# Patient Record
Sex: MALE | Race: WHITE | NOT HISPANIC OR LATINO | Employment: FULL TIME | URBAN - METROPOLITAN AREA
[De-identification: names, ages, dates, MRNs, and addresses within clinical notes are randomized per-mention and may not be internally consistent; named-entity substitution may affect disease eponyms.]

---

## 2018-02-14 ENCOUNTER — TELEPHONE (OUTPATIENT)
Dept: UROLOGY | Facility: MEDICAL CENTER | Age: 63
End: 2018-02-14

## 2018-02-27 ENCOUNTER — OFFICE VISIT (OUTPATIENT)
Dept: SLEEP CENTER | Facility: CLINIC | Age: 63
End: 2018-02-27
Payer: COMMERCIAL

## 2018-02-27 VITALS
HEIGHT: 78 IN | DIASTOLIC BLOOD PRESSURE: 90 MMHG | WEIGHT: 255 LBS | BODY MASS INDEX: 29.5 KG/M2 | SYSTOLIC BLOOD PRESSURE: 120 MMHG | HEART RATE: 80 BPM

## 2018-02-27 DIAGNOSIS — G47.33 OSA (OBSTRUCTIVE SLEEP APNEA): Primary | ICD-10-CM

## 2018-02-27 DIAGNOSIS — G25.81 RLS (RESTLESS LEGS SYNDROME): ICD-10-CM

## 2018-02-27 PROCEDURE — 99213 OFFICE O/P EST LOW 20 MIN: CPT | Performed by: PSYCHIATRY & NEUROLOGY

## 2018-02-27 RX ORDER — ROSUVASTATIN CALCIUM 5 MG/1
TABLET, COATED ORAL
COMMUNITY
Start: 2017-11-07

## 2018-02-27 RX ORDER — PANTOPRAZOLE SODIUM 40 MG/1
40 TABLET, DELAYED RELEASE ORAL DAILY
Refills: 3 | COMMUNITY
Start: 2017-12-26 | End: 2020-08-24

## 2018-02-27 RX ORDER — GABAPENTIN 300 MG/1
300 CAPSULE ORAL
Qty: 30 CAPSULE | Refills: 1 | Status: SHIPPED | OUTPATIENT
Start: 2018-02-27 | End: 2018-09-14 | Stop reason: ALTCHOICE

## 2018-02-27 NOTE — PROGRESS NOTES
Progress Note - Sleep Center   Camacho Moreira 59 y o  male MRN: [id number]        Follow Up Evaluation / Problem:     Obstructive Sleep Apnea  Restless Legs Syndrome    HPI: Camacho Moreira is a 59y o  year old male  Previously diagnosed with obstructive sleep apnea and restless legs syndrome  Snoring and abnormal breathing has been well controlled using nasal CPAP at 8 centimeters of water  Unfortunately, his uncontrolled leg movements continue to be a problem  Now reports that he is having some difficulty in the evening prior to getting into bed  While lying on a sofa watching television his legs suddenly become uncomfortable  This is associated with an irrepressible urge to move the extremities  Symptoms improved significantly with movement  He denies any symptoms earlier today  He is able to fall asleep at night but then tends to [facility] his legs all night long  This makes it difficult for his wife to stay in bed with him  He is reportedly unaware of this problem  Review of Systems        Genitourinary erectile dysfunction   Cardiology lightheadedness   Gastrointestinal heartburn/acid reflux   Neurology difficulity finding words   Constitutional none   Integumentary none   Psychiatry anxiety   Musculoskeletal none   Pulmonary shortness of breath   ENT nasal or sinus congestion   Endocrine none   Hematological none           Current Outpatient Prescriptions:     gabapentin (NEURONTIN) 300 mg capsule, Take 1 capsule (300 mg total) by mouth daily at bedtime, Disp: 30 capsule, Rfl: 1    pantoprazole (PROTONIX) 40 mg tablet, Take 40 mg by mouth daily, Disp: , Rfl: 3    rosuvastatin (CRESTOR) 5 mg tablet, 1/2 tab po daily, Disp: , Rfl:     [place] Sleepiness Scale  Sitting and reading: Slight chance of dozing  Watching TV: Slight chance of dozing  Sitting, inactive in a public place (e g  a theatre or a meeting):  Would never doze  As a passenger in a car for an hour without a break: Would never doze  Lying down to rest in the afternoon when circumstances permit: High chance of dozing  Sitting and talking to someone: Would never doze  Sitting quietly after a lunch without alcohol: Slight chance of dozing  In a car, while stopped for a few minutes in traffic: Would never doze  Total score: 6              Vitals:    02/27/18 0900   BP: 120/90   Pulse: 80   Weight: 116 kg (255 lb)   Height: 6' 7" (2 007 m)       Body mass index is 28 73 kg/m²  EPWORTH SLEEPINES SCORE  Total score: 6    Historical Information       Past History Since Last Sleep Center Visit:     There has been no significant change since his last visit with the exception of demonstrating more significant symptoms early in the evening  He was previously well controlled using small doses of pramipexole however reports episodes of daytime sleepiness and possible with orthostasis without syncope  He has been tolerating nasal CPAP without difficulty  OBJECTIVE    PAP Pressure: Nasal CPAP set to deliver 8 cm of water pressure  DME Provider: [facility]    Physical Exam:     General:   well-nourished, well developed, well groomed and in no distress      Craniofacial:   normal    Nasal Airway:   normal    Oral Airway:   Mallampati  class IV, tonsils not seen    Neck:   normal, thyroid normal, carotid are normal bilaterally    Lungs:  clear bilaterally    Cardiovascular:   normal    Extremities:   normal    Neurological:   no change      ASSESSMENT / PLAN    1  RLS (restless legs syndrome)  gabapentin (NEURONTIN) 300 mg capsule   2  MADELEINE (obstructive sleep apnea)           Counseling / Coordination of Care  Total clinic time spent today 15 minutes  Greater than 50% of total time was spent with The patient and / or family counseling and / or coordination of care  A description of the counseling and / or coordination of care: We talked about maintaining his current level of nasal CPAP    I will order new supplies so that he can continue using this treatment over the next 12 months  Due to his difficulty with pramipexole the medication was discontinued  I have decided he might try gabapentin  He will start using 300 milligrams at approximately 9 p m  each night  He will contact the Sleep [address] [place] [address] in 1-2 weeks with a progress report  Medication will be titrated as needed for control of symptoms  If he demonstrates significant drowsiness prior to bedtime I may moved gabapentin to a later time and using other medications such as tramadol to help suppress leg movements in the evening while awake  Jack Ramsey DO    Board Certified in [place] [employer]

## 2018-02-27 NOTE — PROGRESS NOTES
Review of Systems      Genitourinary erectile dysfunction   Cardiology lightheadedness   Gastrointestinal heartburn/acid reflux   Neurology difficulity finding words   Constitutional none   Integumentary none   Psychiatry anxiety   Musculoskeletal none   Pulmonary shortness of breath   ENT nasal or sinus congestion   Endocrine none   Hematological none

## 2018-03-26 ENCOUNTER — OFFICE VISIT (OUTPATIENT)
Dept: UROLOGY | Facility: MEDICAL CENTER | Age: 63
End: 2018-03-26
Payer: COMMERCIAL

## 2018-03-26 VITALS
DIASTOLIC BLOOD PRESSURE: 78 MMHG | HEIGHT: 78 IN | SYSTOLIC BLOOD PRESSURE: 142 MMHG | WEIGHT: 250 LBS | BODY MASS INDEX: 28.93 KG/M2

## 2018-03-26 DIAGNOSIS — N52.37 ERECTILE DYSFUNCTION FOLLOWING PROSTATE ABLATIVE THERAPY: ICD-10-CM

## 2018-03-26 DIAGNOSIS — Z85.46 HISTORY OF MALIGNANT NEOPLASM OF PROSTATE: Primary | ICD-10-CM

## 2018-03-26 DIAGNOSIS — Z87.438 HISTORY OF BPH: ICD-10-CM

## 2018-03-26 LAB
SL AMB  POCT GLUCOSE, UA: NEGATIVE
SL AMB LEUKOCYTE ESTERASE,UA: NEGATIVE
SL AMB POCT BILIRUBIN,UA: NEGATIVE
SL AMB POCT BLOOD,UA: NEGATIVE
SL AMB POCT CLARITY,UA: CLEAR
SL AMB POCT COLOR,UA: YELLOW
SL AMB POCT KETONES,UA: NEGATIVE
SL AMB POCT NITRITE,UA: NEGATIVE
SL AMB POCT PH,UA: 5
SL AMB POCT SPECIFIC GRAVITY,UA: >=1.03
SL AMB POCT URINE PROTEIN: NEGATIVE
SL AMB POCT UROBILINOGEN: 0.2

## 2018-03-26 PROCEDURE — 81003 URINALYSIS AUTO W/O SCOPE: CPT | Performed by: UROLOGY

## 2018-03-26 PROCEDURE — 99215 OFFICE O/P EST HI 40 MIN: CPT | Performed by: UROLOGY

## 2018-03-26 RX ORDER — SILDENAFIL 50 MG/1
100 TABLET, FILM COATED ORAL DAILY PRN
Qty: 2 TABLET | Refills: 0 | Status: SHIPPED | COMMUNITY
Start: 2018-03-26 | End: 2019-04-04

## 2018-03-26 NOTE — LETTER
March 26, 2018     Graciela Ya DO  [address]    Patient: Heather Figueroa   YOB: 1955   Date of Visit: 3/26/2018       Dear Dr Erik Ly:    Thank you for referring Idris Neumann to me for evaluation  Below are my notes for this consultation  If you have questions, please do not hesitate to call me  I look forward to following your patient along with you  Sincerely,        Shaji Elizondo MD        CC: No Recipients  Shaji Elizondo MD  3/26/2018  9:47 AM  Sign at close encounter  Assessment/Plan:    No problem-specific Assessment & Plan notes found for this encounter  Diagnoses and all orders for this visit:    History of bladder cancer  -     POCT urine dip auto non-scope          Subjective:      Patient ID: Heather Figueroa is a 59 y o  male  HPI     Prostate cancer: The patient had a robotic prostatectomy on May 4, 2015 for a [place] 4 + 3 carcinoma in 10% of tissue, negative margins  Since then, he has had no signs or symptoms of recurrent disease  PSA on March 23, 2018 remains undetectable  He still wears a thin pad on a daily basis just to be safe  Does not do Kegals  He notes urinary frequency  He denies other significant urinary symptoms  Denies  gross hematuria, urinary tract infections or incontinence  He is taking No meds for his symptoms  IPSS Questionnaire (AUA-7): Over the past month    1)  How often have you had a sensation of not emptying your bladder completely after you finish urinating? 1 - Less than 1 time in 5   2)  How often have you had to urinate again less than two hours after you finished urinating? 2 - Less than half the time   3)  How often have you found you stopped and started again several times when you urinated? 0 - Not at all   4) How difficult have you found it to postpone urination?   0 - Not at all   5) How often have you had a weak urinary stream?  1 - Less than 1 time in 5   6) How often have you had to push or strain to begin urination? 0 - Not at all   7) How many times did you most typically get up to urinate from the time you went to bed until the time you got up in the morning? 1 - 1 time   Total Score:  5     QOL:   Mostly Satisfied  Erectile dysfunction: This is result of his surgery  He has tried the oral medications, alprostadil and Tri Mix  The Tri Mix is not working very well  He reports penile engorgement however is penis is not stiff enough for intercourse  In the past we discussed a vacuum erection device and  an implant  At that time, he was not interested in pursuing either of these treatments  He wants to try Viagra again  The following portions of the patient's history were reviewed and updated as appropriate: allergies, current medications, past family history, past medical history, past social history, past surgical history and problem list     Review of Systems   Constitutional: Negative for activity change and fatigue  Respiratory: Negative for shortness of breath and wheezing  Cardiovascular: Negative for chest pain  Gastrointestinal: Negative for abdominal pain  Genitourinary: Negative for difficulty urinating, dysuria, frequency, hematuria and urgency  Musculoskeletal: Negative for back pain and gait problem  Skin: Negative  Allergic/Immunologic: Negative  Neurological: Negative  Gabapentin for restless legs  Psychiatric/Behavioral: Negative  Objective:      /78 (BP Location: Left arm, Patient Position: Sitting, Cuff Size: Standard)   Ht 6' 7" (2 007 m)   Wt 113 kg (250 lb)   BMI 28 16 kg/m²           Physical Exam   Constitutional: He is oriented to person, place, and time  He appears well-developed and well-nourished  HENT:   Head: Normocephalic and atraumatic  Neck: Normal range of motion  Neck supple  Pulmonary/Chest: Effort normal    Musculoskeletal: Normal range of motion  Neurological: He is alert and oriented to person, place, and time  He has normal reflexes  Skin: Skin is warm and dry  Psychiatric: He has a normal mood and affect   His behavior is normal  Judgment and thought content normal

## 2018-03-26 NOTE — PROGRESS NOTES
Assessment/Plan:    No problem-specific Assessment & Plan notes found for this encounter  Diagnoses and all orders for this visit:    History of bladder cancer  -     POCT urine dip auto non-scope          Subjective:      Patient ID: Brandon Green is a 59 y o  male  HPI     Prostate cancer: The patient had a robotic prostatectomy on May 4, 2015 for a [place] 4 + 3 carcinoma in 10% of tissue, negative margins  Since then, he has had no signs or symptoms of recurrent disease  PSA on March 23, 2018 remains undetectable  He still wears a thin pad on a daily basis just to be safe  Does not do Kegals  He notes urinary frequency  He denies other significant urinary symptoms  Denies  gross hematuria, urinary tract infections or incontinence  He is taking No meds for his symptoms  IPSS Questionnaire (AUA-7): Over the past month    1)  How often have you had a sensation of not emptying your bladder completely after you finish urinating? 1 - Less than 1 time in 5   2)  How often have you had to urinate again less than two hours after you finished urinating? 2 - Less than half the time   3)  How often have you found you stopped and started again several times when you urinated? 0 - Not at all   4) How difficult have you found it to postpone urination? 0 - Not at all   5) How often have you had a weak urinary stream?  1 - Less than 1 time in 5   6) How often have you had to push or strain to begin urination? 0 - Not at all   7) How many times did you most typically get up to urinate from the time you went to bed until the time you got up in the morning? 1 - 1 time   Total Score:  5     QOL:   Mostly Satisfied  Erectile dysfunction: This is result of his surgery  He has tried the oral medications, alprostadil and Tri Mix  The Tri Mix is not working very well  He reports penile engorgement however is penis is not stiff enough for intercourse    In the past we discussed a vacuum erection device and  an implant  At that time, he was not interested in pursuing either of these treatments  He wants to try Viagra again  The following portions of the patient's history were reviewed and updated as appropriate: allergies, current medications, past family history, past medical history, past social history, past surgical history and problem list     Review of Systems   Constitutional: Negative for activity change and fatigue  Respiratory: Negative for shortness of breath and wheezing  Cardiovascular: Negative for chest pain  Gastrointestinal: Negative for abdominal pain  Genitourinary: Negative for difficulty urinating, dysuria, frequency, hematuria and urgency  Musculoskeletal: Negative for back pain and gait problem  Skin: Negative  Allergic/Immunologic: Negative  Neurological: Negative  Gabapentin for restless legs  Psychiatric/Behavioral: Negative  Objective:      /78 (BP Location: Left arm, Patient Position: Sitting, Cuff Size: Standard)   Ht 6' 7" (2 007 m)   Wt 113 kg (250 lb)   BMI 28 16 kg/m²          Physical Exam   Constitutional: He is oriented to person, place, and time  He appears well-developed and well-nourished  HENT:   Head: Normocephalic and atraumatic  Neck: Normal range of motion  Neck supple  Pulmonary/Chest: Effort normal    Musculoskeletal: Normal range of motion  Neurological: He is alert and oriented to person, place, and time  He has normal reflexes  Skin: Skin is warm and dry  Psychiatric: He has a normal mood and affect   His behavior is normal  Judgment and thought content normal

## 2018-04-20 ENCOUNTER — TELEPHONE (OUTPATIENT)
Dept: SLEEP CENTER | Facility: CLINIC | Age: 63
End: 2018-04-20

## 2018-04-20 NOTE — TELEPHONE ENCOUNTER
Received a vm from patient he reports  he is snoring with his Cpap at [facility]  It started a month ago  Call back spoke to spouse  She states he is snoring a lot at [facility] with machine in use  Also he stopped his gabapentin for RLS due to his eyes are blurry and he thinks it makes him not have dreams  It also didn't stop his symptoms of leg movement at [facility]  His wife states he also has trifocals and this could be the problem  He hasn't followed up with eye Doctor  Looks like pressure set at 8 cm on cpap

## 2018-04-24 DIAGNOSIS — G47.33 OSA ON CPAP: Primary | ICD-10-CM

## 2018-04-24 DIAGNOSIS — Z99.89 OSA ON CPAP: Primary | ICD-10-CM

## 2018-05-02 ENCOUNTER — TRANSCRIBE ORDERS (OUTPATIENT)
Dept: SLEEP CENTER | Facility: CLINIC | Age: 63
End: 2018-05-02

## 2018-09-14 ENCOUNTER — OFFICE VISIT (OUTPATIENT)
Dept: SLEEP CENTER | Facility: CLINIC | Age: 63
End: 2018-09-14
Payer: COMMERCIAL

## 2018-09-14 VITALS
BODY MASS INDEX: 27.24 KG/M2 | WEIGHT: 235.4 LBS | DIASTOLIC BLOOD PRESSURE: 80 MMHG | HEIGHT: 78 IN | SYSTOLIC BLOOD PRESSURE: 122 MMHG | HEART RATE: 80 BPM

## 2018-09-14 DIAGNOSIS — G25.81 RLS (RESTLESS LEGS SYNDROME): ICD-10-CM

## 2018-09-14 DIAGNOSIS — G47.33 OSA (OBSTRUCTIVE SLEEP APNEA): Primary | ICD-10-CM

## 2018-09-14 PROCEDURE — 99214 OFFICE O/P EST MOD 30 MIN: CPT | Performed by: NURSE PRACTITIONER

## 2018-09-14 RX ORDER — PRAMIPEXOLE DIHYDROCHLORIDE 0.25 MG/1
TABLET ORAL
COMMUNITY
Start: 2018-07-23 | End: 2018-09-14 | Stop reason: ALTCHOICE

## 2018-09-14 RX ORDER — CLONAZEPAM 0.5 MG/1
0.5 TABLET ORAL
Qty: 30 TABLET | Refills: 3 | Status: SHIPPED | OUTPATIENT
Start: 2018-09-14 | End: 2018-12-18 | Stop reason: SDUPTHER

## 2018-09-14 RX ORDER — CYCLOBENZAPRINE HCL 5 MG
TABLET ORAL
COMMUNITY
Start: 2018-08-01 | End: 2019-06-03 | Stop reason: ALTCHOICE

## 2018-09-14 NOTE — PATIENT INSTRUCTIONS
1  Begin clonazepam 0 5 mg at bedtime  As we discussed you may take half a tablet to evaluate its effects and increase if needed  2   Continue use of CPAP equipment nightly and put it back on after you go back to bed from awakening  2  Continue to clean your equipment, as discussed  3  Contact the Sleep [address] [place] [address] with any questions or concerns prior to your next visit, as needed  4    Schedule visit for follow-up in 3 months

## 2018-09-14 NOTE — PROGRESS NOTES
Progress Note - [facility] [address] 59 y o  male   :1955, MRN: [id number]  2018          Follow Up Evaluation / Problem:     Obstructive Sleep Apnea  Restless legs syndrome    HPI: Caitlin Bernal is a 59y o  year old male  He presents for follow up of obstructive sleep apnea and restless legs syndrome  He uses CPAP at 9cm of water pressure and feels comfortable when using it  He monitors his usage, air leakage and AHI daily, using his phone elke, and records this data on a paper chart  He has problems with continuing symptoms of restless legs  He was using pramipexole, which seemed to help, but began to have some dizziness with exercise, which he felt was related to this medication  After his last visit, he began gabapentin 300mg at bedtime  He noticed that after taking it for a few days, he was waking up with a dull, aching pain in the base of his skull  He stopped gabapentin        Review of Systems      Genitourinary none   Cardiology none   Gastrointestinal frequent heartburn/acid reflux   Neurology need to move extremities   Constitutional none   Integumentary none   Psychiatry none   Musculoskeletal legs twitching/jerking   Pulmonary snoring   ENT none   Endocrine none   Hematological none       Current Outpatient Prescriptions:     pantoprazole (PROTONIX) 40 mg tablet, Take 40 mg by mouth daily, Disp: , Rfl: 3    rosuvastatin (CRESTOR) 5 mg tablet, 1/2 tab po daily, Disp: , Rfl:     clonazePAM (KlonoPIN) 0 5 mg tablet, Take 1 tablet (0 5 mg total) by mouth daily at bedtime, Disp: 30 tablet, Rfl: 3    cyclobenzaprine (FLEXERIL) 5 mg tablet, , Disp: , Rfl:     sildenafil (VIAGRA) 50 MG tablet, Take 2 tablets (100 mg total) by mouth daily as needed for erectile dysfunction (Patient not taking: Reported on 2018 ), Disp: 2 tablet, Rfl: 0    [place] Sleepiness Scale  Sitting and reading: Slight chance of dozing  Watching TV: Slight chance of dozing  Sitting, inactive in a public place (e g  a theatre or a meeting): Would never doze  As a passenger in a car for an hour without a break: Would never doze  Lying down to rest in the afternoon when circumstances permit: Moderate chance of dozing  Sitting and talking to someone: Would never doze  Sitting quietly after a lunch without alcohol: Would never doze  In a car, while stopped for a few minutes in traffic: Would never doze  Total score: 4              Vitals:    09/14/18 1100   BP: 122/80   Pulse: 80   Weight: 107 kg (235 lb 6 4 oz)   Height: 6' 7" (2 007 m)       Body mass index is 26 52 kg/m²  EPWORTH SLEEPINESS SCORE  Total score: 4      Past History Since Last Sleep Center Visit:     He denies any changes to his health since his last visit  He continue to use CPAP nightly  His wife tells him that he snores at times while using CPAP  He continues to have restless leg symptoms in the evening while watching TV and his wife tells him that he moves his legs in bed, all night long, causing her to have difficulty sleeping in bed with him  He states that he has been waking up at 4am every morning, going to the bathroom and then returning to bed for 3 more hours without replacing his CPAP  He then wakes up feeling unrefreshed        The review of systems and following portions of the patient's history were reviewed and updated as appropriate: allergies, current medications, past family history, past medical history, past social history, past surgical history, and problem list         OBJECTIVE    PAP Pressure: Nasal CPAP set to deliver 9 cm of water pressure  DME Provider: [facility]    Physical Exam:     General Appearance:   Alert, cooperative, no distress, appears stated age     Head:   Normocephalic, without obvious abnormality, atraumatic     Eyes:   PERRL, conjunctiva/corneas clear, EOM's intact          Nose:  Nares normal, septum midline, no drainage or sinus tenderness           Throat:  Lips, teeth and gums normal; tongue normal size and  shape and midline mucosa moist and redundant bilaterally, uvula long and dipping below the base of the tongue, tonsils not visualized, Mallampati class 4       Neck:  Supple, symmetrical, trachea midline, no adenopathy; Thyroid: No enlargement, tenderness or nodules; no carotid bruit or JVD     Lungs:      Clear to auscultation bilaterally, respirations unlabored     Heart:   Regular rate and rhythm, S1 and S2 normal, no murmur, rub or gallop       Extremities:  Extremities normal, atraumatic, no cyanosis or edema     Pulses:  2+ and symmetric all extremities     Skin:  Skin color, texture, turgor normal, no rashes or lesions       Neurologic:  No focal deficits noted  Normal strength, sensation throughout       ASSESSMENT / PLAN    1  MADELEINE (obstructive sleep apnea)  PAP DME Resupply/Reorder   2  RLS (restless legs syndrome)  clonazePAM (KlonoPIN) 0 5 mg tablet           Counseling / Coordination of Care  Total clinic time spent today 35 minutes  Greater than 50% of total time was spent with the patient and / or family counseling and / or coordination of care  A description of the counseling / coordination of care:     Impressions, Diagnostic results, Prognosis, Instructions for management, Risks and benefits of treatment, Patient and family education, Risk factor reductions and Importance of compliance with treatment    Today I reviewed the patient's compliance data  he has been able to use the equipment 100% of all days recorded  Average usage was 4 or more hours 83% of all days recorded  The estimated AHI is 2 9 abnormal breathing events per hour  Response to treatment has been good  We discussed replacing his CPAP after getting up to use the bathroom, which may allow him to wake up feeling refreshed  CPAP supplies have been ordered for the next 12 months  We discussed his symptoms of restless legs    He does not feel that his evening symptoms are really bothering him  He will begin clonazepam 0 5mg at bedtime, which will hopefully improve his leg movement during sleep and consolidate his sleep, allowing him to stay sleeping longer in the morning  If his restless legs become more bothersome in the evening, he may restart pramipexole 1/2 tablet in the evening and take the clonazepam at bedtime  He will continue using his CPAP equipment at the settings noted above for the next 3 months  At that timehe will then return for a routine follow-up evaluation regarding his restless legs  I have asked him to contact the [address] with an update in the next 2 weeks or if he encounters any difficulties prior to that time  The following instructions have been given to the patient today:    Patient Instructions   1  Begin clonazepam 0 5 mg at bedtime  As we discussed you may take half a tablet to evaluate its effects and increase if needed  2   Continue use of CPAP equipment nightly and put it back on after you go back to bed from awakening  2  Continue to clean your equipment, as discussed  3  Contact the [address] with any questions or concerns prior to your next visit, as needed  4    Schedule visit for follow-up in 3 months        Gail Fernandez, [address] [place] [address]

## 2018-10-16 NOTE — TELEPHONE ENCOUNTER
Pt left message asking if it is ok for him to take half of a clonazePAM 0 5mg tablet? Is it ok to cut them in half?

## 2018-12-18 ENCOUNTER — OFFICE VISIT (OUTPATIENT)
Dept: SLEEP CENTER | Facility: CLINIC | Age: 63
End: 2018-12-18
Payer: COMMERCIAL

## 2018-12-18 VITALS
SYSTOLIC BLOOD PRESSURE: 122 MMHG | HEIGHT: 78 IN | OXYGEN SATURATION: 97 % | BODY MASS INDEX: 27.74 KG/M2 | WEIGHT: 239.8 LBS | HEART RATE: 83 BPM | DIASTOLIC BLOOD PRESSURE: 78 MMHG

## 2018-12-18 DIAGNOSIS — G25.81 RLS (RESTLESS LEGS SYNDROME): ICD-10-CM

## 2018-12-18 DIAGNOSIS — G47.33 OSA (OBSTRUCTIVE SLEEP APNEA): Primary | ICD-10-CM

## 2018-12-18 PROCEDURE — 99214 OFFICE O/P EST MOD 30 MIN: CPT | Performed by: NURSE PRACTITIONER

## 2018-12-18 RX ORDER — CLONAZEPAM 0.5 MG/1
0.5 TABLET ORAL
Qty: 90 TABLET | Refills: 1 | Status: SHIPPED | OUTPATIENT
Start: 2018-12-18 | End: 2019-06-03 | Stop reason: ALTCHOICE

## 2018-12-18 RX ORDER — DOXYCYCLINE HYCLATE 100 MG/1
100 CAPSULE ORAL 2 TIMES DAILY WITH MEALS
Refills: 2 | COMMUNITY
Start: 2018-11-19 | End: 2019-04-04 | Stop reason: DRUGHIGH

## 2018-12-18 NOTE — PROGRESS NOTES
Progress Note - [facility] [address] 59 y o  male   :1955, MRN: [id number]  2018          Follow Up Evaluation / Problem:     Obstructive Sleep Apnea  Restless Legs Syndrome    HPI: Fermin Osborne is a 59y o  year old male  He presents for follow up of restless legs syndrome  He has some symptoms in the evening when he is relaxing on his sofa or using his computer  His wife notices symptoms while he is sleeping, which he is unaware of  He was using pramipexole, which seemed to help, but began to have some dizziness with exercise, which he felt was related to this medication  He then began gabapentin 300mg at bedtime  He noticed that after taking it for a few days, he was waking up with a dull, aching pain in the base of his skull  He stopped gabapentin  At his last visit, he was given a prescription for clonazepam 0 5mg at bedtime  He reports that his wife tells him that he has less night time leg movements when he takes it  He continues to use CPAP every night       Review of Systems      Genitourinary difficulty with erection   Cardiology none   Gastrointestinal frequent heartburn/acid reflux   Neurology awaken with headache   Constitutional none   Integumentary none   Psychiatry none   Musculoskeletal legs twitching/jerking   Pulmonary none   ENT none   Endocrine none   Hematological none       Current Outpatient Prescriptions:     clonazePAM (KlonoPIN) 0 5 mg tablet, Take 1 tablet (0 5 mg total) by mouth daily at bedtime, Disp: 90 tablet, Rfl: 1    doxycycline hyclate (VIBRAMYCIN) 100 mg capsule, Take 100 mg by mouth 2 (two) times a day with meals, Disp: , Rfl: 2    metroNIDAZOLE (NORITRATE) 1 % cream, Apply topically daily, Disp: , Rfl:     Psyllium (KONSYL DAILY FIBER PO), Take by mouth, Disp: , Rfl:     rosuvastatin (CRESTOR) 5 mg tablet, 1/2 tab po daily, Disp: , Rfl:     cyclobenzaprine (FLEXERIL) 5 mg tablet, , Disp: , Rfl:    pantoprazole (PROTONIX) 40 mg tablet, Take 40 mg by mouth daily, Disp: , Rfl: 3    sildenafil (VIAGRA) 50 MG tablet, Take 2 tablets (100 mg total) by mouth daily as needed for erectile dysfunction (Patient not taking: Reported on 9/14/2018 ), Disp: 2 tablet, Rfl: 0    [place] Sleepiness Scale  Sitting and reading: Slight chance of dozing  Watching TV: Moderate chance of dozing  Sitting, inactive in a public place (e g  a theatre or a meeting): Moderate chance of dozing  As a passenger in a car for an hour without a break: Would never doze  Lying down to rest in the afternoon when circumstances permit: Moderate chance of dozing  Sitting and talking to someone: Would never doze  Sitting quietly after a lunch without alcohol: Would never doze  In a car, while stopped for a few minutes in traffic: Would never doze  Total score: 7              Vitals:    12/18/18 1300   BP: 122/78   Pulse: 83   SpO2: 97%   Weight: 109 kg (239 lb 12 8 oz)   Height: 6' 7" (2 007 m)       Body mass index is 27 01 kg/m²  EPWORTH SLEEPINESS SCORE  Total score: 7      Past History Since Last Sleep Center Visit:   He denies any changes to his health since his last visit  He began taking 1/2 tablet of clonazepam at bedtime  His wife reported that he had some improvement in symptoms  He increased the dose to a whole tablet  Symptoms improved even more  He feels more relaxed and feels like he sleeps very well when taking the medication    He reports that he and his wife like to enjoy 2-3 glasses of wine in the evening several times per week and he questions whether this is OK with the use of clonazepam     The review of systems and following portions of the patient's history were reviewed and updated as appropriate: allergies, current medications, past family history, past medical history, past social history, past surgical history, and problem list         OBJECTIVE    PAP Pressure: Nasal CPAP set to deliver 9 cm of water pressure  DME Provider: [facility]    Physical Exam:     General Appearance:   Alert, cooperative, no distress, appears stated age     Head:   Normocephalic, without obvious abnormality, atraumatic     Eyes:   PERRL, conjunctiva/corneas clear, EOM's intact          Nose:  Nares normal, septum midline, no drainage or sinus tenderness           Throat:  Lips, teeth and gums normal; tongue normal size and  shape and midline mucosa moist and redundant bilaterally, uvula long and dipping below the base of the tongue, tonsils not visualized, Mallampati class 4       Neck:  Supple, symmetrical, trachea midline, no adenopathy; Thyroid: No enlargement, tenderness or nodules; no carotid bruit or JVD     Lungs:      Clear to auscultation bilaterally, respirations unlabored     Heart:   Regular rate and rhythm, S1 and S2 normal, no murmur, rub or gallop       Extremities:  Extremities normal, atraumatic, no cyanosis or edema     Pulses:  2+ and symmetric all extremities     Skin:  Skin color, texture, turgor normal, no rashes or lesions       Neurologic:  CNII-XII intact  Normal strength, sensation throughout       ASSESSMENT / PLAN    1  MADELEINE (obstructive sleep apnea)     2  RLS (restless legs syndrome)  clonazePAM (KlonoPIN) 0 5 mg tablet           Counseling / Coordination of Care  Total clinic time spent today 30 minutes  Greater than 50% of total time was spent with the patient and / or family counseling and / or coordination of care  A description of the counseling / coordination of care:     Impressions, Diagnostic results, Prognosis, Instructions for management, Risks and benefits of treatment, Patient and family education, Risk factor reductions and Importance of compliance with treatment    Today, we discussed his symptoms of restless legs that occur on some evenings while relaxing, and also present during the night as periodic limb movements of sleep    He has found that 0 5mg of clonazepam improve his symptoms during the night  He awakens feeling like "he took something", but this wears off within the first hour of being awake  He feels that since he has been taking it at bedtime, he doesn't seem to get as many symptoms earlier in the evening as well  We discussed that he should avoid taking the medication if he is going to drink 2-3 glasses of wine before bedtime  He feels this will not be a problem  He will continue to use his CPAP equipment every night  He has been able to use the equipment 100% of all days recorded  Average usage was 4 or more hours 97% of all days recorded  The estimated AHI is 4 4 abnormal breathing events per hour  Response to treatment has been good  He has been receiving supplies and changing them appropriately  He will continue using this equipment at the settings noted above for the next 6 months  At that timehe will then return for a routine follow-up evaluation  I have asked him to contact the Sleep [facility] if he encounters any difficulties prior to that time  The following instructions have been given to the patient today:    Patient Instructions   1  Continue clonazepam 0 5mg at bedtime to help with periodic limb movement and restless legs in the evening  2   Avoid use with alcohol or prescription pain medications  3   Continue use of CPAP  4  Schedule appointment for follow up in 6 months          Mason Rosales, [address]

## 2018-12-18 NOTE — PATIENT INSTRUCTIONS
1   Continue clonazepam 0 5mg at bedtime to help with periodic limb movement and restless legs in the evening  2   Avoid use with alcohol or prescription pain medications  3   Continue use of CPAP  4  Schedule appointment for follow up in 6 months

## 2019-04-04 ENCOUNTER — OFFICE VISIT (OUTPATIENT)
Dept: UROLOGY | Facility: MEDICAL CENTER | Age: 64
End: 2019-04-04
Payer: COMMERCIAL

## 2019-04-04 VITALS
HEART RATE: 87 BPM | HEIGHT: 78 IN | SYSTOLIC BLOOD PRESSURE: 126 MMHG | DIASTOLIC BLOOD PRESSURE: 84 MMHG | WEIGHT: 241 LBS | BODY MASS INDEX: 27.88 KG/M2

## 2019-04-04 DIAGNOSIS — Z85.46 HISTORY OF MALIGNANT NEOPLASM OF PROSTATE: Primary | ICD-10-CM

## 2019-04-04 LAB
SL AMB  POCT GLUCOSE, UA: NEGATIVE
SL AMB LEUKOCYTE ESTERASE,UA: NEGATIVE
SL AMB POCT BILIRUBIN,UA: NEGATIVE
SL AMB POCT BLOOD,UA: NEGATIVE
SL AMB POCT CLARITY,UA: CLEAR
SL AMB POCT COLOR,UA: YELLOW
SL AMB POCT KETONES,UA: NORMAL
SL AMB POCT NITRITE,UA: NEGATIVE
SL AMB POCT PH,UA: 6
SL AMB POCT SPECIFIC GRAVITY,UA: 1.02
SL AMB POCT URINE PROTEIN: NEGATIVE
SL AMB POCT UROBILINOGEN: 0.2

## 2019-04-04 PROCEDURE — 81003 URINALYSIS AUTO W/O SCOPE: CPT | Performed by: UROLOGY

## 2019-04-04 PROCEDURE — 99214 OFFICE O/P EST MOD 30 MIN: CPT | Performed by: UROLOGY

## 2019-04-04 RX ORDER — DOXYCYCLINE HYCLATE 50 MG/1
CAPSULE ORAL
COMMUNITY
Start: 2019-04-03 | End: 2019-06-03 | Stop reason: ALTCHOICE

## 2019-06-03 ENCOUNTER — OFFICE VISIT (OUTPATIENT)
Dept: SLEEP CENTER | Facility: CLINIC | Age: 64
End: 2019-06-03
Payer: COMMERCIAL

## 2019-06-03 VITALS
WEIGHT: 243 LBS | SYSTOLIC BLOOD PRESSURE: 132 MMHG | HEIGHT: 78 IN | DIASTOLIC BLOOD PRESSURE: 88 MMHG | HEART RATE: 82 BPM | BODY MASS INDEX: 28.11 KG/M2

## 2019-06-03 DIAGNOSIS — G47.8 NON-RESTORATIVE SLEEP: ICD-10-CM

## 2019-06-03 DIAGNOSIS — Z99.89 OBSTRUCTIVE SLEEP APNEA TREATED WITH CONTINUOUS POSITIVE AIRWAY PRESSURE (CPAP): Primary | ICD-10-CM

## 2019-06-03 DIAGNOSIS — G47.33 OBSTRUCTIVE SLEEP APNEA TREATED WITH CONTINUOUS POSITIVE AIRWAY PRESSURE (CPAP): Primary | ICD-10-CM

## 2019-06-03 DIAGNOSIS — G25.81 RLS (RESTLESS LEGS SYNDROME): ICD-10-CM

## 2019-06-03 PROCEDURE — 99214 OFFICE O/P EST MOD 30 MIN: CPT | Performed by: NURSE PRACTITIONER

## 2019-06-03 RX ORDER — GABAPENTIN 300 MG/1
300 CAPSULE ORAL
Qty: 30 CAPSULE | Refills: 5 | Status: SHIPPED | OUTPATIENT
Start: 2019-06-03 | End: 2020-02-10 | Stop reason: ALTCHOICE

## 2019-06-04 PROBLEM — G47.8 NON-RESTORATIVE SLEEP: Status: ACTIVE | Noted: 2019-06-04

## 2019-06-05 ENCOUNTER — APPOINTMENT (OUTPATIENT)
Dept: LAB | Facility: MEDICAL CENTER | Age: 64
End: 2019-06-05
Payer: COMMERCIAL

## 2019-06-05 DIAGNOSIS — Z85.46 HISTORY OF MALIGNANT NEOPLASM OF PROSTATE: ICD-10-CM

## 2019-06-05 DIAGNOSIS — G25.81 RLS (RESTLESS LEGS SYNDROME): ICD-10-CM

## 2019-06-05 LAB
FERRITIN SERPL-MCNC: 279 NG/ML (ref 8–388)
IRON SATN MFR SERPL: 42 %
IRON SERPL-MCNC: 139 UG/DL (ref 65–175)
MAGNESIUM SERPL-MCNC: 2.5 MG/DL (ref 1.6–2.6)
PSA SERPL-MCNC: <0.1 NG/ML (ref 0–4)
TIBC SERPL-MCNC: 330 UG/DL (ref 250–450)

## 2019-06-05 PROCEDURE — 36415 COLL VENOUS BLD VENIPUNCTURE: CPT

## 2019-06-05 PROCEDURE — 82728 ASSAY OF FERRITIN: CPT

## 2019-06-05 PROCEDURE — 83735 ASSAY OF MAGNESIUM: CPT

## 2019-06-05 PROCEDURE — 83540 ASSAY OF IRON: CPT

## 2019-06-05 PROCEDURE — G0103 PSA SCREENING: HCPCS

## 2019-06-05 PROCEDURE — 83550 IRON BINDING TEST: CPT

## 2019-06-06 ENCOUNTER — TELEPHONE (OUTPATIENT)
Dept: UROLOGY | Facility: MEDICAL CENTER | Age: 64
End: 2019-06-06

## 2019-06-12 ENCOUNTER — TRANSCRIBE ORDERS (OUTPATIENT)
Dept: PHYSICAL THERAPY | Facility: CLINIC | Age: 64
End: 2019-06-12

## 2019-06-12 ENCOUNTER — EVALUATION (OUTPATIENT)
Dept: PHYSICAL THERAPY | Facility: CLINIC | Age: 64
End: 2019-06-12
Payer: COMMERCIAL

## 2019-06-12 DIAGNOSIS — M25.512 LEFT SHOULDER PAIN, UNSPECIFIED CHRONICITY: Primary | ICD-10-CM

## 2019-06-12 DIAGNOSIS — M25.512 ACUTE PAIN OF LEFT SHOULDER: Primary | ICD-10-CM

## 2019-06-12 PROCEDURE — 97161 PT EVAL LOW COMPLEX 20 MIN: CPT

## 2019-06-19 ENCOUNTER — OFFICE VISIT (OUTPATIENT)
Dept: PHYSICAL THERAPY | Facility: CLINIC | Age: 64
End: 2019-06-19
Payer: COMMERCIAL

## 2019-06-19 DIAGNOSIS — M25.512 ACUTE PAIN OF LEFT SHOULDER: Primary | ICD-10-CM

## 2019-06-19 PROCEDURE — 97112 NEUROMUSCULAR REEDUCATION: CPT

## 2019-06-19 PROCEDURE — 97140 MANUAL THERAPY 1/> REGIONS: CPT

## 2019-06-19 PROCEDURE — 97110 THERAPEUTIC EXERCISES: CPT

## 2019-06-25 ENCOUNTER — APPOINTMENT (OUTPATIENT)
Dept: PHYSICAL THERAPY | Facility: CLINIC | Age: 64
End: 2019-06-25
Payer: COMMERCIAL

## 2019-06-26 ENCOUNTER — OFFICE VISIT (OUTPATIENT)
Dept: PHYSICAL THERAPY | Facility: CLINIC | Age: 64
End: 2019-06-26
Payer: COMMERCIAL

## 2019-06-26 DIAGNOSIS — M25.512 ACUTE PAIN OF LEFT SHOULDER: Primary | ICD-10-CM

## 2019-06-26 PROCEDURE — 97112 NEUROMUSCULAR REEDUCATION: CPT

## 2019-06-26 PROCEDURE — 97110 THERAPEUTIC EXERCISES: CPT

## 2019-06-26 PROCEDURE — 97140 MANUAL THERAPY 1/> REGIONS: CPT

## 2019-07-02 ENCOUNTER — OFFICE VISIT (OUTPATIENT)
Dept: PHYSICAL THERAPY | Facility: CLINIC | Age: 64
End: 2019-07-02
Payer: COMMERCIAL

## 2019-07-02 DIAGNOSIS — M25.512 ACUTE PAIN OF LEFT SHOULDER: Primary | ICD-10-CM

## 2019-07-02 PROCEDURE — 97112 NEUROMUSCULAR REEDUCATION: CPT | Performed by: PHYSICAL THERAPIST

## 2019-07-02 PROCEDURE — 97110 THERAPEUTIC EXERCISES: CPT | Performed by: PHYSICAL THERAPIST

## 2019-07-02 PROCEDURE — 97140 MANUAL THERAPY 1/> REGIONS: CPT | Performed by: PHYSICAL THERAPIST

## 2019-07-02 NOTE — PROGRESS NOTES
Daily Note     Today's date: 2019  Patient name: Caitlin Bernal  : 1955  MRN: [id number]  Referring provider: Boris Ramachandran MD  Dx:   Encounter Diagnosis     ICD-10-CM    1  Acute pain of left shoulder M25 512                   Subjective: Pt reported that his left shoulder has been feeling great  He was able to carry 30# bags of mulch with no issues yesterday  Objective: See treatment diary below    Precautions: history of prostate cancer    Daily Treatment Diary   Manuals           L shoulder PROM EH LF CB                                                 Exercise Diary              UBE (fwd/back) 3 min ea x3min ea x3min ea          Pulleys 10" hold, 6 min 10" hold, x6min 10" hold x6 min          Wall ball circles Cw/ccw  20x2 ea Cw, ccw 2x20 ea Cw, ccw 2x20 ea          Scapular retractions 3"x20 3"x20 3"x20                       TB rows Blue  3" hold, 2x10 Blue 3" hold, 2x10 Blue 3" hold 2x10          TB extensions Blue  2x10 Blue 2x10 Blue 2x10          TB lawn mowers Pink   2x10 Pink 2x10 Pink 2x10          Serratus punch 2x10 3# 2x10 3# 2x10           S/L capsular stretch (IR/ER) 30"x3  ea 30" x3 ea 30'x3 ea                                                                                                                                                         Modalities             CP post prn deferred defers                            Assessment: Tolerated treatment well  Patient would benefit from continued PT  Pt continues to present with L shoulder external rotation weakness and slight pain with isometric test against wall  No pain with exercises and was given seated external rotation stretching/strap internal rotation stretch secondary to confusion and difficulty with performing S/L stretch  Continue to strength periscapular as tolerated with slow progressions in rotation  External rotation most limited motion at today's session (approximately 75 degrees)         Plan: Continue per plan of care

## 2019-07-03 ENCOUNTER — APPOINTMENT (OUTPATIENT)
Dept: PHYSICAL THERAPY | Facility: CLINIC | Age: 64
End: 2019-07-03
Payer: COMMERCIAL

## 2019-07-10 ENCOUNTER — OFFICE VISIT (OUTPATIENT)
Dept: PHYSICAL THERAPY | Facility: CLINIC | Age: 64
End: 2019-07-10
Payer: COMMERCIAL

## 2019-07-10 DIAGNOSIS — M25.512 ACUTE PAIN OF LEFT SHOULDER: Primary | ICD-10-CM

## 2019-07-10 PROCEDURE — 97112 NEUROMUSCULAR REEDUCATION: CPT

## 2019-07-10 PROCEDURE — 97110 THERAPEUTIC EXERCISES: CPT

## 2019-07-10 PROCEDURE — 97140 MANUAL THERAPY 1/> REGIONS: CPT | Performed by: PHYSICAL THERAPIST

## 2019-07-10 NOTE — PROGRESS NOTES
Addendum per Eva Dubon PT, OCS  PT initiated on 6/12/19, patient has attended x 4 visits  Rx has consisted of ROM, stretching, strengthening program and home exercise program     S: Pt denies L shoulder pain  Received a steroidal injection for treatment of poison ivy on 6/26/19 and wonders of this has contributed to pain relief  No recent sleep interruption associated with shoulder discomfort  Able to lift bags of mulch and do yardwork  O: Patient has full L shoulder AROM/PROM    Strength is 5/5  Impingment signs are negative  A: L shoulder doing remarkably well considering he has rotator cuff tear confirmed by MRI  P: Encrouraged continued HEP which was reviewed with him today  Patient to follow up with Dr Chet Todd next week

## 2019-07-10 NOTE — LETTER
2019    MD Dinesh Tanner[address] [address]    Patient: Rod Hoff   YOB: 1955   Date of Visit: 7/10/2019     Encounter Diagnosis     ICD-10-CM    1  Acute pain of left shoulder M25 512        Dear Dr Swati Botello:    Please review the attached Plan of Care from [facility] recent visit  Please verify that you agree therapy should continue by signing the attached document and sending it back to our office  If you have any questions or concerns, please don't hesitate to call  Sincerely,    Sharee Aleman, PT      Referring Provider:      I certify that I have read the below Plan of Care and certify the need for these services furnished under this plan of treatment while under my care  Marco Parker MD  [facility] 31: [phone]          Daily Note     Today's date: 7/10/2019  Patient name: Rod Hoff  : 1955  MRN: [id number]  Referring provider: Gracie Moreira MD  Dx:   Encounter Diagnosis     ICD-10-CM    1  Acute pain of left shoulder M25 512          Addendum per Virginia Pablo PT, OCS  PT initiated on 19, patient has attended x 4 visits  Rx has consisted of ROM, stretching, strengthening program and home exercise program     S: Pt denies L shoulder pain  Received a steroidal injection for treatment of poison ivy on 19 and wonders of this has contributed to pain relief  No recent sleep interruption associated with shoulder discomfort  Able to lift bags of mulch and do yardwork  O: Patient has full L shoulder AROM/PROM    Strength is 5/5  Impingment signs are negative  A: L shoulder doing remarkably well considering he has rotator cuff tear confirmed by MRI  P: Encrouraged continued HEP which was reviewed with him today  Patient to follow up with Dr Swati Botello next week

## 2019-07-10 NOTE — LETTER
July 10, 2019    Tae Palma MD  [facility] [address]    Patient: Caitlin Bernal   YOB: 1955   Date of Visit: 7/10/2019     Encounter Diagnosis     ICD-10-CM    1  Acute pain of left shoulder M25 512        Dear Dr Neo Estrada:    Please review the attached Plan of Care from [facility] recent visit  Please verify that you agree therapy should continue by signing the attached document and sending it back to our office  If you have any questions or concerns, please don't hesitate to call  Sincerely,    Nayana Ventura PT      Referring Provider:      I certify that I have read the below Plan of Care and certify the need for these services furnished under this plan of treatment while under my care  Tae Palma MD  [facility] 31: [phone]          Daily Note     Today's date: 7/10/2019  Patient name: Caitlin Bernal  : 1955  MRN: [id number]  Referring provider: Boris Ramachandran MD  Dx:   Encounter Diagnosis     ICD-10-CM    1  Acute pain of left shoulder M25 512           Addendum per Kody Honeycutt PT, OCS  PT initiated on 19, patient has attended x 4 visits  Rx has consisted of ROM, stretching, strengthening program and home exercise program     S: Pt denies L shoulder pain  Received a steroidal injection for treatment of poison ivy on 19 and wonders of this has contributed to pain relief  No recent sleep interruption associated with shoulder discomfort  Able to lift bags of mulch and do yardwork  O: Patient has full L shoulder AROM/PROM    Strength is 5/5  Impingment signs are negative  A: L shoulder doing remarkably well considering he has rotator cuff tear confirmed by MRI  P: Encrouraged continued HEP which was reviewed with him today  Patient to follow up with Dr Neo Estrada next week  room air

## 2019-07-10 NOTE — PROGRESS NOTES
Daily Note     Today's date: 7/10/2019  Patient name: Rik Acosta  : 1955  MRN: [id number]  Referring provider: Jeanine Rodriguez MD  Dx:   Encounter Diagnosis     ICD-10-CM    1  Acute pain of left shoulder M25 512          Subjective: Patient noted no new complaints  Lifted bags of mulch again with no pain before coming to therapy  Objective: See treatment diary below      Assessment:  Patient noted minor soreness with ball on wall cw  Patient assessed by PT RG  Patient discussed with physical therapist RG dc/to hep today due to patient feeling much better and pain free for two weeks since steroid injection for poison ivy  Reviewed HEP with patient  Patient was able to perform exercise program with good technique  Plan: Continue per plan of care        Precautions: history of prostate cancer     Daily Treatment Diary   Manuals 6/19 6/26 7/2  7/10               L shoulder PROM EH LF CB  RG                                                                                       Exercise Diary                        UBE (fwd/back) 3 min ea x3min ea x3min ea  x3 min ea               Pulleys 10" hold, 6 min 10" hold, x6min 10" hold x6 min  10" hold x6 min               Wall ball circles Cw/ccw  20x2 ea Cw, ccw 2x20 ea Cw, ccw 2x20 ea  Cw, ccw 2x20 ea               Scapular retractions 3"x20 3"x20 3"x20                                         TB rows Blue  3" hold, 2x10 Blue 3" hold, 2x10 Blue 3" hold 2x10  Blue 3" hold 2x10               TB extensions Blue  2x10 Blue 2x10 Blue 2x10  Blue 2x10               TB lawn mowers Pink   2x10 Pink 2x10 Pink 2x10 KAMERON TB 2x10               Serratus punch 2x10 3# 2x10 3# 2x10   4# 2x10               S/L capsular stretch (IR/ER) 30"x3  ea 30" x3 ea 30'x3 ea  30''x3ea                                                                                                                                                                                                                                                                                       Modalities                       CP post prn deferred defers

## 2019-10-25 ENCOUNTER — TELEPHONE (OUTPATIENT)
Dept: SLEEP CENTER | Facility: CLINIC | Age: 64
End: 2019-10-25

## 2019-10-25 NOTE — TELEPHONE ENCOUNTER
Left message that labs are normal, no need for follow-up, & to call nurse line with any questions, # provided

## 2019-10-25 NOTE — TELEPHONE ENCOUNTER
----- Message from Maru Silverman sent at 10/25/2019  7:56 AM EDT -----  Lab results are normal   No follow up needed

## 2020-02-10 ENCOUNTER — OFFICE VISIT (OUTPATIENT)
Dept: SLEEP CENTER | Facility: CLINIC | Age: 65
End: 2020-02-10
Payer: COMMERCIAL

## 2020-02-10 VITALS — WEIGHT: 246 LBS | BODY MASS INDEX: 28.46 KG/M2 | HEIGHT: 78 IN

## 2020-02-10 DIAGNOSIS — G47.33 OBSTRUCTIVE SLEEP APNEA TREATED WITH CONTINUOUS POSITIVE AIRWAY PRESSURE (CPAP): Primary | ICD-10-CM

## 2020-02-10 DIAGNOSIS — G47.8 NON-RESTORATIVE SLEEP: ICD-10-CM

## 2020-02-10 DIAGNOSIS — Z99.89 OBSTRUCTIVE SLEEP APNEA TREATED WITH CONTINUOUS POSITIVE AIRWAY PRESSURE (CPAP): Primary | ICD-10-CM

## 2020-02-10 DIAGNOSIS — G25.81 RLS (RESTLESS LEGS SYNDROME): ICD-10-CM

## 2020-02-10 PROCEDURE — 99214 OFFICE O/P EST MOD 30 MIN: CPT | Performed by: NURSE PRACTITIONER

## 2020-02-10 NOTE — PATIENT INSTRUCTIONS
1   Continue use of CPAP equipment nightly  2  Continue to clean your equipment, as discussed  3  Contact the Sleep [address] [place] [address] with any questions or concerns prior to your next visit, as needed  4  Schedule visit for follow-up in 1 year  5  You may consider adding vitamin D3 2000 units daily and 4000 units from November thru April      Nursing Support:  When: Monday through Friday 7A-5PM except holidays  Where: Our direct line is [phone]  If you are having a true emergency please call 911  In the event that the line is busy or it is after hours please leave a voice message and we will return your call  Please speak clearly, leaving your full name, birth date, best number to reach you and the reason for your call  Medication refills: We will need the name of the medication, the dosage, the ordering provider, whether you get a 30 or 90 day refill, and the pharmacy name and address  Medications will be ordered by the provider only  Nurses cannot call in prescriptions  Please allow 7 days for medication refills  Physician requested updates: If your provider requested that you call with an update after starting medication, please be ready to provide us the medication and dosage, what time you take your medication, the time you attempt to fall asleep, time you fall asleep, when you wake up, and what time you get out of bed  Sleep Study Results: We will contact you with sleep study results and/or next steps after the physician has reviewed your testing

## 2020-02-11 ENCOUNTER — TELEPHONE (OUTPATIENT)
Dept: SLEEP CENTER | Facility: CLINIC | Age: 65
End: 2020-02-11

## 2020-04-08 ENCOUNTER — TELEPHONE (OUTPATIENT)
Dept: UROLOGY | Facility: MEDICAL CENTER | Age: 65
End: 2020-04-08

## 2020-04-13 ENCOUNTER — APPOINTMENT (OUTPATIENT)
Dept: LAB | Facility: MEDICAL CENTER | Age: 65
End: 2020-04-13
Attending: UROLOGY
Payer: COMMERCIAL

## 2020-04-13 ENCOUNTER — TRANSCRIBE ORDERS (OUTPATIENT)
Dept: ADMINISTRATIVE | Facility: HOSPITAL | Age: 65
End: 2020-04-13

## 2020-04-13 DIAGNOSIS — Z85.46 H/O MALIGNANT NEOPLASM OF PROSTATE: ICD-10-CM

## 2020-04-13 DIAGNOSIS — Z85.46 H/O MALIGNANT NEOPLASM OF PROSTATE: Primary | ICD-10-CM

## 2020-04-13 LAB — PSA SERPL-MCNC: <0.1 NG/ML (ref 0–4)

## 2020-04-13 PROCEDURE — 84153 ASSAY OF PSA TOTAL: CPT

## 2020-04-15 ENCOUNTER — TELEPHONE (OUTPATIENT)
Dept: UROLOGY | Facility: MEDICAL CENTER | Age: 65
End: 2020-04-15

## 2020-05-21 ENCOUNTER — TELEPHONE (OUTPATIENT)
Dept: SLEEP CENTER | Facility: CLINIC | Age: 65
End: 2020-05-21

## 2020-05-21 DIAGNOSIS — G25.81 RLS (RESTLESS LEGS SYNDROME): Primary | ICD-10-CM

## 2020-05-21 RX ORDER — PRAMIPEXOLE DIHYDROCHLORIDE 0.25 MG/1
TABLET ORAL
Qty: 60 TABLET | Refills: 3 | Status: SHIPPED | OUTPATIENT
Start: 2020-05-21

## 2021-03-11 ENCOUNTER — OFFICE VISIT (OUTPATIENT)
Dept: SLEEP CENTER | Facility: CLINIC | Age: 66
End: 2021-03-11
Payer: MEDICARE

## 2021-03-11 VITALS
SYSTOLIC BLOOD PRESSURE: 110 MMHG | DIASTOLIC BLOOD PRESSURE: 62 MMHG | HEIGHT: 78 IN | BODY MASS INDEX: 28.35 KG/M2 | WEIGHT: 245 LBS

## 2021-03-11 DIAGNOSIS — G47.33 OBSTRUCTIVE SLEEP APNEA TREATED WITH CONTINUOUS POSITIVE AIRWAY PRESSURE (CPAP): Primary | ICD-10-CM

## 2021-03-11 DIAGNOSIS — G25.81 RLS (RESTLESS LEGS SYNDROME): ICD-10-CM

## 2021-03-11 DIAGNOSIS — Z99.89 OBSTRUCTIVE SLEEP APNEA TREATED WITH CONTINUOUS POSITIVE AIRWAY PRESSURE (CPAP): Primary | ICD-10-CM

## 2021-03-11 PROCEDURE — 99214 OFFICE O/P EST MOD 30 MIN: CPT | Performed by: NURSE PRACTITIONER

## 2021-03-11 RX ORDER — ROPINIROLE 0.5 MG/1
0.5 TABLET, FILM COATED ORAL
Qty: 30 TABLET | Refills: 5 | Status: SHIPPED | OUTPATIENT
Start: 2021-03-11

## 2021-03-11 NOTE — PROGRESS NOTES
Progress Note - [facility] [address] 73 y o  male   :1955, MRN: [id number]  3/11/2021        Follow Up Evaluation / Problem:  Obstructive Sleep Apnea  Restless Legs Syndrome      Thank you for the opportunity of participating in the evaluation and care of this patient in the Sleep Clinic at [facility]  HPI: Alisha Carson is a 73y o  year old male  The patient presents for follow up of obstructive sleep apnea and restless legs syndrome  He has used CPAP for many years and has been very compliant with use, despite his dislike of wearing the mask  He has used gabapentin, pramipexole and clonazepam to treat restless legs  He had some minor side effects with pramipexole and was concerned regarding long-term side effects of gabapentin and clonazepam     Review of Systems      Genitourinary difficulty with erection   Cardiology none   Gastrointestinal frequent heartburn/acid reflux   Neurology frequent headaches   Constitutional none   Integumentary none   Psychiatry none   Musculoskeletal legs twitching/jerking   Pulmonary none   ENT none   Endocrine none   Hematological none         Current Outpatient Medications:     Na Sulfate-K Sulfate-Mg Sulf 17 5-3 13-1 6 GM/177ML SOLN, Take 1 Package by mouth 2 (two) times a day, Disp: 2 Bottle, Rfl: 0    Omega-3 Fatty Acids (fish oil) 1,000 mg, Take 1,000 mg by mouth daily, Disp: , Rfl:     pantoprazole (PROTONIX) 40 mg tablet, Take 1 tablet (40 mg total) by mouth 2 (two) times a day before meals, Disp: 180 tablet, Rfl: 3    pramipexole (MIRAPEX) 0 25 mg tablet, Take one tablet at onset of evening symptoms, may repeat at bedtime, if needed  , Disp: 60 tablet, Rfl: 3    Psyllium (KONSYL DAILY FIBER PO), Take by mouth, Disp: , Rfl:     rosuvastatin (CRESTOR) 5 mg tablet, 1/2 tab po daily, Disp: , Rfl:     rOPINIRole (REQUIP) 0 5 mg tablet, Take 1 tablet (0 5 mg total) by mouth daily at bedtime, Disp: 30 tablet, Rfl: 5    [place] Sleepiness Scale  Sitting and reading: Would never doze  Watching TV: Slight chance of dozing  Sitting, inactive in a public place (e g  a theatre or a meeting): Would never doze  As a passenger in a car for an hour without a break: Would never doze  Lying down to rest in the afternoon when circumstances permit: Moderate chance of dozing  Sitting and talking to someone: Would never doze  Sitting quietly after a lunch without alcohol: Would never doze  In a car, while stopped for a few minutes in traffic: Would never doze  Total score: 3              Vitals:    03/11/21 1300   BP: 110/62   Weight: 111 kg (245 lb)   Height: 6' 7" (2 007 m)       Body mass index is 27 6 kg/m²  Neck Circumference: 17       [place] SLEEPINESS SCORE  Total score: 3      Past History Since Last Sleep Center Visit:   He denies any changes to his health since his last visit  He continues to use the CPAP equipment every night, due to snoring bothering his wife  Mask and pressure are comfortable  His wife notices some intermittent snoring with use  He reports that he does not change the mask cushions regularly  He had improvement in limb movements with use of gabapentin, but stopped due to concern of long-term side effects  He was using pramipexole 1/2 tablet in the evening and 1/2 tablet at bedtime for restless leg symptoms  His wife reported improvement in symptoms when taking this medication  He noticed a dull headache at the "base of the brain" in the morning after taking pramipexole  Symptoms occurred when he first started the medication, but seemed to go away the longer he was taking it       The review of systems and following portions of the patient's history were reviewed and updated as appropriate: allergies, current medications, past family history, past medical history, past social history, past surgical history, and problem list         OBJECTIVE    PAP Pressure: Nasal CPAP set to deliver 9 cm of water pressure  Type of mask used: full face  DME Provider: [facility]    Physical Exam:     General Appearance:   Alert, cooperative, no distress, appears stated age     Head:   Normocephalic, without obvious abnormality, atraumatic     Eyes:   PERRL, conjunctiva/corneas clear, EOM's intact          Nose:  Nares normal, septum midline, no drainage or sinus tenderness           Throat:  Lips, teeth and gums normal; tongue normal size and  shape and midline mucosa moist with excessive soft tissue at the oropharynx, uvula normal, tonsils not visualized, Mallampati class 3-4       Neck:  Supple, symmetrical, trachea midline, no adenopathy; Thyroid: No enlargement, tenderness or nodules; no carotid bruit or JVD     Lungs:      Clear to auscultation bilaterally, respirations unlabored     Heart:   Regular rate and rhythm, S1 and S2 normal, no murmur, rub or gallop       Extremities:  Extremities normal, atraumatic, no cyanosis or edema       Skin:  Skin color, texture, turgor normal, no rashes or lesions       Neurologic:  No focal deficits noted       ASSESSMENT / PLAN    1  Obstructive sleep apnea treated with continuous positive airway pressure (CPAP)     2  RLS (restless legs syndrome)  rOPINIRole (REQUIP) 0 5 mg tablet           Counseling / Coordination of Care  Total clinic time spent today 30 minutes  Greater than 50% of total time was spent with the patient and / or family counseling and / or coordination of care  A description of the counseling / coordination of care:     Impressions, Diagnostic results, Prognosis, Instructions for management, Risks and benefits of treatment, Patient and family education, Risk factor reductions and Importance of compliance with treatment    Today I reviewed the patient's compliance data  he has been able to use the equipment 100% of all days recorded    Average usage was 4 or more hours 93% of all days recorded  The estimated AHI is 1 8 abnormal breathing events per hour  The patient feels they benefit from the use of PAP equipment and would like to continue PAP therapy  Response to treatment has been good  Supplies have been ordered for the next year  He was encouraged to change his cushions more often, which may make use more comfortable and improve noise with use  He reports that he took vitamin D and fish oil for awhile, but did not notice any improvement in RLS symptoms  He restarted pramipexole  We discussed trying ropinirole, which may also work well for him, but may not cause the headache  He will hold pramipexole while using ropinirole  He will start with 1/2 tablet in the evening at onset of symptoms and repeat at bedtime  We discussed also splitting dose using pramipexole and ropinirole to see if the combination may further improve  He will call with an update when refills are needed  He will continue using this equipment at the settings noted above for the next 12 months  At that timehe will then return for a routine follow-up evaluation  I have asked the patient to contact the [employer] [address] [place] [address] if he encounters any difficulties prior to that time  The following instructions have been given to the patient today:    Patient Instructions   1  Continue use of CPAP equipment nightly  2  Continue to clean your equipment, as discussed  3  Contact the Sleep [address] [place], [place] [address]do [facility] with any questions or concerns prior to your next visit, as needed  4  Schedule visit for follow-up in 1 year  5  Try ropinirole 0 5mg - 1/2 tablet to start, you may increase to a full tablet, which you may want to split dose between early evening and bedtime  6   Call when you need refills  Nursing Support:  When: Monday through Friday 7A-5PM except holidays  Where: Our direct line is [phone]  If you are having a true emergency please call 911    In the event that the line is busy or it is after hours please leave a voice message and we will return your call  Please speak clearly, leaving your full name, birth date, best number to reach you and the reason for your call  Medication refills: We will need the name of the medication, the dosage, the ordering provider, whether you get a 30 or 90 day refill, and the pharmacy name and address  Medications will be ordered by the provider only  Nurses cannot call in prescriptions  Please allow 7 days for medication refills  Physician requested updates: If your provider requested that you call with an update after starting medication, please be ready to provide us the medication and dosage, what time you take your medication, the time you attempt to fall asleep, time you fall asleep, when you wake up, and what time you get out of bed  Sleep Study Results: We will contact you with sleep study results and/or next steps after the physician has reviewed your testing        Abdiaziz Singh, [address] [facility]

## 2021-03-11 NOTE — PATIENT INSTRUCTIONS
1   Continue use of CPAP equipment nightly  2  Continue to clean your equipment, as discussed  3  Contact the Sleep [address] [place] [address] with any questions or concerns prior to your next visit, as needed  4  Schedule visit for follow-up in 1 year  5  Try ropinirole 0 5mg - 1/2 tablet to start, you may increase to a full tablet, which you may want to split dose between early evening and bedtime  6   Call when you need refills  Nursing Support:  When: Monday through Friday 7A-5PM except holidays  Where: Our direct line is [phone]  If you are having a true emergency please call 911  In the event that the line is busy or it is after hours please leave a voice message and we will return your call  Please speak clearly, leaving your full name, birth date, best number to reach you and the reason for your call  Medication refills: We will need the name of the medication, the dosage, the ordering provider, whether you get a 30 or 90 day refill, and the pharmacy name and address  Medications will be ordered by the provider only  Nurses cannot call in prescriptions  Please allow 7 days for medication refills  Physician requested updates: If your provider requested that you call with an update after starting medication, please be ready to provide us the medication and dosage, what time you take your medication, the time you attempt to fall asleep, time you fall asleep, when you wake up, and what time you get out of bed  Sleep Study Results: We will contact you with sleep study results and/or next steps after the physician has reviewed your testing

## 2021-03-12 ENCOUNTER — TELEPHONE (OUTPATIENT)
Dept: SLEEP CENTER | Facility: CLINIC | Age: 66
End: 2021-03-12

## 2021-05-10 DIAGNOSIS — R97.20 ELEVATED PSA: Primary | ICD-10-CM

## 2021-06-18 ENCOUNTER — OFFICE VISIT (OUTPATIENT)
Dept: UROLOGY | Facility: MEDICAL CENTER | Age: 66
End: 2021-06-18
Payer: MEDICARE

## 2021-06-18 VITALS
WEIGHT: 250 LBS | BODY MASS INDEX: 28.93 KG/M2 | SYSTOLIC BLOOD PRESSURE: 120 MMHG | HEART RATE: 63 BPM | HEIGHT: 78 IN | DIASTOLIC BLOOD PRESSURE: 78 MMHG

## 2021-06-18 DIAGNOSIS — Z87.898 HISTORY OF ELEVATED PSA: ICD-10-CM

## 2021-06-18 DIAGNOSIS — Z12.5 SPECIAL SCREENING FOR MALIGNANT NEOPLASM OF PROSTATE: ICD-10-CM

## 2021-06-18 DIAGNOSIS — Z85.46 HISTORY OF PROSTATE CANCER: Primary | ICD-10-CM

## 2021-06-18 DIAGNOSIS — N52.31 ERECTILE DYSFUNCTION AFTER RADICAL PROSTATECTOMY: ICD-10-CM

## 2021-06-18 LAB
SL AMB  POCT GLUCOSE, UA: ABNORMAL
SL AMB LEUKOCYTE ESTERASE,UA: ABNORMAL
SL AMB POCT BILIRUBIN,UA: ABNORMAL
SL AMB POCT BLOOD,UA: ABNORMAL
SL AMB POCT CLARITY,UA: CLEAR
SL AMB POCT COLOR,UA: YELLOW
SL AMB POCT KETONES,UA: ABNORMAL
SL AMB POCT NITRITE,UA: ABNORMAL
SL AMB POCT PH,UA: 5
SL AMB POCT SPECIFIC GRAVITY,UA: 1.02
SL AMB POCT URINE PROTEIN: ABNORMAL
SL AMB POCT UROBILINOGEN: 0.2

## 2021-06-18 PROCEDURE — 81003 URINALYSIS AUTO W/O SCOPE: CPT | Performed by: UROLOGY

## 2021-06-18 PROCEDURE — 99214 OFFICE O/P EST MOD 30 MIN: CPT | Performed by: UROLOGY

## 2021-06-18 NOTE — PROGRESS NOTES
Assessment/Plan:    History of prostate cancer  No evidence of recurrent disease approximately 6 years after radical prostatectomy  Reassess in 1 year  Erectile dysfunction after radical prostatectomy  Patient will consider trying injection therapy again  We can titrate the dose higher to try to give him an erection  He will discuss this with his wife before filling the prescription  Diagnoses and all orders for this visit:    History of prostate cancer  -     PSA, Total Screen; Future    History of elevated PSA  -     POCT urine dip auto non-scope    Erectile dysfunction after radical prostatectomy  -     Papav-Phentolamine-Alprostadil (PGE1 / PHENTOLAMINE / PAPAVERINE, TRIMIX, 40 MCG / 1 MG / 30 MG INJECTION); 1 mL by Intracavernosal route once for 1 dose    Special screening for malignant neoplasm of prostate  -     PSA, Total Screen; Future          Subjective:      Patient ID: Poonam Dixon is a 73 y o  male  HPI  Prostate cancer:    The patient had a robotic prostatectomy on May 4, 2015 for a Deanna 4 + 3 carcinoma in 10% of tissue, negative margins   Since then, he has had no signs or symptoms of recurrent disease    He still wears a thin pad on a daily basis just to be safe   Does not do Kegals         He notes urinary frequency  He denies other significant urinary symptoms  He denies gross hematuria, urinary tract infections or incontinence  He is taking neither medications nor supplements for his symptoms  PSA:  Less than 0 01 on May 13, 2021 at [facility] labs  The official report is in the media tab     0   Lab Value Date/Time    PSA <0 1 04/13/2020 1423    PSA <0 1 06/05/2019 1243   ]    AUA SYMPTOM SCORE      Most Recent Value   AUA SYMPTOM SCORE   How often have you had a sensation of not emptying your bladder completely after you finished urinating? 1   How often have you had to urinate again less than two hours after you finished urinating?   3   How often have you found you stopped and started again several times when you urinate? 1   How often have you found it difficult to postpone urination? 1   How often have you had a weak urinary stream?  1   How often have you had to push or strain to begin urination? 0   How many times did you most typically get up to urinate from the time you went to bed at night until the time you got up in the morning? 1   Quality of Life: If you were to spend the rest of your life with your urinary condition just the way it is now, how would you feel about that?  2   AUA SYMPTOM SCORE  8        Erectile dysfunction:   The patient developed erectile dysfunction as result of his prostatectomy  In the past he has tried both oral medications and injection therapy  The oral medication did not work at all in the injection therapy was mediocre at best   We discussed trying the injection therapy again if he wants to try to resume sexual activity  We also discussed an implant and I demonstrated it for him  The patient patient reports that he and his wife are pretty content with the status quo  He took a prescription for Tri Mix with him so he could discuss this with his wife once again and decide whether to proceed with injection therapy or not  The following portions of the patient's history were reviewed and updated as appropriate: allergies, current medications, past family history, past medical history, past social history, past surgical history and problem list     Review of Systems        Objective:      /78   Pulse 63   Ht 6' 7" (2 007 m)   Wt 113 kg (250 lb)   BMI 28 16 kg/m²          Physical Exam  Constitutional:       Appearance: He is well-developed  HENT:      Head: Normocephalic and atraumatic  Pulmonary:      Effort: Pulmonary effort is normal    Musculoskeletal:         General: Normal range of motion  Cervical back: Normal range of motion  Skin:     General: Skin is warm and dry     Neurological: Mental Status: He is alert and oriented to person, place, and time  Psychiatric:         Behavior: Behavior normal          Thought Content:  Thought content normal          Judgment: Judgment normal

## 2021-06-19 PROBLEM — R97.20 ELEVATED PSA: Status: ACTIVE | Noted: 2021-06-19

## 2021-06-19 PROBLEM — Z85.46 HISTORY OF PROSTATE CANCER: Status: ACTIVE | Noted: 2021-06-19

## 2021-06-19 PROBLEM — N52.31 ERECTILE DYSFUNCTION AFTER RADICAL PROSTATECTOMY: Status: ACTIVE | Noted: 2021-06-19

## 2021-06-19 PROBLEM — R97.20 ELEVATED PSA: Status: RESOLVED | Noted: 2021-06-19 | Resolved: 2021-06-19

## 2021-06-19 NOTE — ASSESSMENT & PLAN NOTE
No evidence of recurrent disease approximately 6 years after radical prostatectomy  Reassess in 1 year

## 2021-06-19 NOTE — ASSESSMENT & PLAN NOTE
Patient will consider trying injection therapy again  We can titrate the dose higher to try to give him an erection  He will discuss this with his wife before filling the prescription

## 2022-03-10 PROBLEM — R73.03 PRE-DIABETES: Status: ACTIVE | Noted: 2017-10-04

## 2022-03-10 PROBLEM — Z82.49 FAMILY HISTORY OF HEART DISEASE: Status: ACTIVE | Noted: 2020-12-01

## 2022-03-10 PROBLEM — L71.9 ROSACEA: Status: ACTIVE | Noted: 2019-04-23

## 2022-03-11 ENCOUNTER — OFFICE VISIT (OUTPATIENT)
Dept: SLEEP CENTER | Facility: CLINIC | Age: 67
End: 2022-03-11
Payer: MEDICARE

## 2022-03-11 VITALS
DIASTOLIC BLOOD PRESSURE: 79 MMHG | HEART RATE: 95 BPM | SYSTOLIC BLOOD PRESSURE: 122 MMHG | HEIGHT: 78 IN | WEIGHT: 243 LBS | BODY MASS INDEX: 28.11 KG/M2

## 2022-03-11 DIAGNOSIS — G47.33 OSA (OBSTRUCTIVE SLEEP APNEA): Primary | ICD-10-CM

## 2022-03-11 DIAGNOSIS — G25.81 RLS (RESTLESS LEGS SYNDROME): ICD-10-CM

## 2022-03-11 DIAGNOSIS — G47.61 PERIODIC LIMB MOVEMENT SLEEP DISORDER: ICD-10-CM

## 2022-03-11 PROCEDURE — 99214 OFFICE O/P EST MOD 30 MIN: CPT | Performed by: PSYCHIATRY & NEUROLOGY

## 2022-03-11 NOTE — ASSESSMENT & PLAN NOTE
Has not had benefit from several medications including clonazepam, dopamine agonists, and gabapentin  It is possible this is causing non-refreshing sleep but that said, he has not had symptomatic relief from any of these agents  At present, I would not resume a new medication  We discussed that he drinks alcohol in significant quantities, these medications would not be compatible with that, this is something that he was very cognizant of  He has never been on Horizant, that may be a consideration in the future if he cut back on alcohol consumption and wants to try a new medication  He notes he has pramipexole at home and wants to know if he could resume a medication if he does not drink alcohol  Will we reviewed that augmentation would be a potential side effect in the future, in general I would recommend he not start this without discussing 1st with me

## 2022-03-11 NOTE — ASSESSMENT & PLAN NOTE
Has occasional restless leg syndrome which does not merit treatment, does not cause him much disturbance    Most recent ferritin seemed very normal

## 2022-03-11 NOTE — PROGRESS NOTES
Review of Systems      Genitourinary none   Cardiology none   Gastrointestinal none   Neurology none   Constitutional none   Integumentary none   Psychiatry none   Musculoskeletal legs twitching/jerking   Pulmonary none   ENT none   Endocrine none   Hematological none

## 2022-03-11 NOTE — ASSESSMENT & PLAN NOTE
He is doing well with his current treatment, AHI is normal   I reordered supplies for him today  He notes that he uses CPAP but does not like CPAP  We reviewed treatment alternatives including an oral appliance, or repeat sleep study to see if you would be a candidate for Inspire  In thinking further about this, he wishes to stay on CPAP now but will contact me if he wishes to pursue an alternative treatment

## 2022-03-11 NOTE — PROGRESS NOTES
Assessment/Plan:      1  MADELEINE (obstructive sleep apnea)  Assessment & Plan:  He is doing well with his current treatment, AHI is normal   I reordered supplies for him today  He notes that he uses CPAP but does not like CPAP  We reviewed treatment alternatives including an oral appliance, or repeat sleep study to see if you would be a candidate for Inspire  In thinking further about this, he wishes to stay on CPAP now but will contact me if he wishes to pursue an alternative treatment  Orders:  -     PAP DME Resupply/Reorder    2  Periodic limb movement sleep disorder  Assessment & Plan:  Has not had benefit from several medications including clonazepam, dopamine agonists, and gabapentin  It is possible this is causing non-refreshing sleep but that said, he has not had symptomatic relief from any of these agents  At present, I would not resume a new medication  We discussed that he drinks alcohol in significant quantities, these medications would not be compatible with that, this is something that he was very cognizant of  He has never been on Horizant, that may be a consideration in the future if he cut back on alcohol consumption and wants to try a new medication  He notes he has pramipexole at home and wants to know if he could resume a medication if he does not drink alcohol  Will we reviewed that augmentation would be a potential side effect in the future, in general I would recommend he not start this without discussing 1st with me  3  RLS (restless legs syndrome)  Assessment & Plan:  Has occasional restless leg syndrome which does not merit treatment, does not cause him much disturbance  Most recent ferritin seemed very normal            Subjective:      Patient ID: Carito Toledo is a 78 y o  male  Mr Vik Mckinney returns in follow-up of obstructive sleep apnea and restless leg syndrome  He was last seen by Mason Rosales approximately 1 year ago      He was diagnosed with obstructive sleep apnea in approximately 2007  He recalls he was snoring loudly back then  A retest in 2014 showed a respiratory disturbance index of 14 7 on the baseline portion of the test, titrated to CPAP 8 cm H2O  In addition periodic limb movements of sleep were noted, 62 per hour  Notes in 2016 indicated that his pramipexole was started for periodic limb movements of sleep  This medication was then discontinued as he indicated dizziness with exercise and he was switched to gabapentin in 2018, notes also indicated restless legs in the evening  Gabapentin was noted to cause a dull pain in his scalp so it was discontinued  He was then switched to clonazepam for periodic limb movements of sleep, it seemed that restless leg syndrome was not active  Per notes, he discontinued clonazepam as notes indicate there was concern with use of alcohol and that medication, he was then switched to gabapentin which in 2019 was noted to be effective  In 2020, he resume pramipexole again and found to be effective  At his visit 1 year ago, he was switched to ropinirole  He stopped it shortly thereafter  In discussion, he notes that when he watches TV at night, he has a feeling of tightening in his leg, not nightly  This occurs a few times a week  His wife also tells him he moves his legs a lot in his sleep  PAP Data  AHI 0 9  Used 30/30 nights  Average session 6 hr 56 min   Airsense 10 auto set at 9 cm h20  95% leak- high  Uses a full face mask    He is happy with his energy level in the day  Notes that he is still not-refreshed when he wakes    No napping or dozing in the day  He generally goes to bed 12 am, finally wakes at 8-830 AM for good  Has 2 awakenings  Sleeps 5-7 hours a night  He perceives that his sleep quality is affected by PAP use             [place] Sleepiness Scale:     Sitting and reading: Would never doze  Watching TV: Would never doze  Sitting, inactive in a public place (e g  a theatre or a meeting): Would never doze  As a passenger in a car for an hour without a break: Would never doze  Lying down to rest in the afternoon when circumstances permit: Moderate chance of dozing  Sitting and talking to someone: Would never doze  Sitting quietly after a lunch without alcohol: Would never doze  In a car, while stopped for a few minutes in traffic: Would never doze  Total score: 2     The following portions of the patient's history were reviewed and updated as appropriate: allergies, current medications, past family history, past medical history, past social history, past surgical history, and problem list     Review of Systems    Genitourinary none   Cardiology none   Gastrointestinal none   Neurology none   Constitutional none   Integumentary none   Psychiatry none   Musculoskeletal legs twitching/jerking   Pulmonary none   ENT none   Endocrine none   Hematological none       Objective:      /79 (BP Location: Left arm, Patient Position: Sitting, Cuff Size: Large)   Pulse 95   Ht 6' 7" (2 007 m)   Wt 110 kg (243 lb)   BMI 27 38 kg/m²          Physical Exam  Constitutional:       Appearance: Normal appearance  HENT:      Head: Normocephalic and atraumatic  Mouth/Throat:      Comments: mallamoati 3-4 airway, elongated soft palate, crossbite   Cardiovascular:      Rate and Rhythm: Normal rate and regular rhythm  Pulses: Normal pulses  Heart sounds: Normal heart sounds  Pulmonary:      Effort: Pulmonary effort is normal       Breath sounds: Normal breath sounds  Neurological:      Mental Status: He is alert           Labs reviewed- serum ferritin 233 ng/ml last 10/2021

## 2022-03-14 ENCOUNTER — TELEPHONE (OUTPATIENT)
Dept: SLEEP CENTER | Facility: CLINIC | Age: 67
End: 2022-03-14

## 2022-04-29 ENCOUNTER — TELEPHONE (OUTPATIENT)
Dept: OTHER | Facility: OTHER | Age: 67
End: 2022-04-29

## 2022-05-03 ENCOUNTER — TELEPHONE (OUTPATIENT)
Dept: OTHER | Facility: OTHER | Age: 67
End: 2022-05-03

## 2022-05-03 NOTE — TELEPHONE ENCOUNTER
If patient has not had proper TriMix education performed by [facility] that appointment needs to be scheduled

## 2022-05-03 NOTE — TELEPHONE ENCOUNTER
Papav-Phentolamine-Alprostadil (PGE1 / PHENTOLAMINE / PAPAVERINE, TRIMIX, 40 MCG / 1 MG / 30 MG INJECTION) [[id number]]    Patient filled RX today at [facility]  RX was prescribed by uro on 06/18/2021 and patient did not fill until today  Please reach out to patient he is not sure what dosage to take  It does not state anywhere on the RX and please see if medication is still something he can be on  Prescriber doctor Renée Christy is retired

## 2022-05-03 NOTE — TELEPHONE ENCOUNTER
Call placed to patient  He did not answer  LMOM asking patient to contact the office to review recommendations of the CRNP and that appointment needs to be scheduled for injection teaching  Office number provided for the patient to call back and discuss

## 2022-05-03 NOTE — TELEPHONE ENCOUNTER
Call placed to patient and spoke with him  Pt informed me that when he last saw Dr Yumi Collins he asked for script for Trimix injection  Pt was taught how to inject this medication and has done this in the past    He is interested in restarting this again and wanted to know what the dosing percentage he should be starting at  Pt stated that according to his script it stated 1ml  Pt wanted to inform me that he will be starting with this amount and will contact the office if this does not work to discuss further treatment

## 2022-10-02 NOTE — PROGRESS NOTES
Progress Note - [facility] [address] [place] 60 y o  male   :1955, MRN: [id number]  2/10/2020          Follow Up Evaluation / Problem:  Obstructive Sleep Apnea  Restless Legs Syndrome      Thank you for the opportunity of participating in the evaluation and care of this patient [facility]  HPI: Danielle Israel is a 60y o  year old male  The patient presents for follow up of obstructive sleep apnea and restless legs syndrome  He has used CPAP for many years and has been very compliant with use, despite his dislike of wearing the mask  He has used gabapentin, pramipexole and clonazepam to treat restless legs, however, he has stopped the medications due to concern of side effects and harm to his body over time      Review of Systems      Genitourinary need to urinate more than twice a night   Cardiology none   Gastrointestinal frequent heartburn/acid reflux   Neurology frequent headaches   Constitutional none   Integumentary none   Psychiatry none   Musculoskeletal legs twitching/jerking   Pulmonary none   ENT none   Endocrine none   Hematological none       Current Outpatient Medications:     pantoprazole (PROTONIX) 40 mg tablet, Take 40 mg by mouth daily, Disp: , Rfl: 3    Psyllium (KONSYL DAILY FIBER PO), Take by mouth, Disp: , Rfl:     rosuvastatin (CRESTOR) 5 mg tablet, 1/2 tab po daily, Disp: , Rfl:     [place] Sleepiness Scale  Sitting and reading: Slight chance of dozing  Watching TV: Slight chance of dozing  Sitting, inactive in a public place (e g  a theatre or a meeting): Slight chance of dozing  As a passenger in a car for an hour without a break: Slight chance of dozing  Lying down to rest in the afternoon when circumstances permit: Slight chance of dozing  Sitting and talking to someone: Slight chance of dozing  Sitting quietly after a lunch without alcohol: Slight chance of dozing  In a car, while stopped for a few minutes in traffic: Slight chance of dozing  Total score: 8              Vitals:    02/10/20 1300   Weight: 112 kg (246 lb)   Height: 6' 7" (2 007 m)       Body mass index is 27 71 kg/m²  Neck Circumference: 17       [place] SLEEPINESS SCORE  Total score: 8      Past History Since Last Sleep Center Visit:   He reports recent difficulty using CPAP equipment, due to URI for over 2 weeks last month  He continues to use his CPAP every night  He does not like sleeping with it and feels that he sleeps better when he is not using it  He continues to use it, due to concerns of complications of untreated sleep apnea  He was using gabapentin 300mg at bedtime for restless legs  His wife noticed improvement with use of gabapentin  He has stopped using gabapentin due to concern of possible side effects over time  The patient reports that he cleans the equipment appropriately  He does not change supplies very regularly      The review of systems and following portions of the patient's history were reviewed and updated as appropriate: allergies, current medications, past family history, past medical history, past social history, past surgical history, and problem list         OBJECTIVE    PAP Pressure: Nasal CPAP set to deliver 9 cm of water pressure  Type of mask used: full face  DME Provider: [facility]    Physical Exam:     General Appearance:   Alert, cooperative, no distress, appears stated age     Head:   Normocephalic, without obvious abnormality, atraumatic     Eyes:   PERRL, conjunctiva/corneas clear, EOM's intact          Nose:  Nares normal, septum midline, no drainage or sinus tenderness           Throat:  Lips, teeth and gums normal; tongue normal size and  shape and midline mucosa moist with excessive soft tissue at the oropharynx, uvula normal, tonsils not visualized, Mallampati class 4       Neck:  Supple, symmetrical, trachea midline, no adenopathy; Thyroid: No enlargement, tenderness or nodules; no carotid bruit or JVD     Lungs:      Clear to auscultation bilaterally, respirations unlabored     Heart:   Regular rate and rhythm, S1 and S2 normal, no murmur, rub or gallop       Extremities:  Extremities normal, atraumatic, no cyanosis or edema       Skin:  Skin color, texture, turgor normal, no rashes or lesions       Neurologic:  No focal deficits noted       ASSESSMENT / PLAN    1  Obstructive sleep apnea treated with continuous positive airway pressure (CPAP)  PAP DME Resupply/Reorder   2  Non-restorative sleep     3  RLS (restless legs syndrome)             Counseling / Coordination of Care  Total clinic time spent today 30 minutes  Greater than 50% of total time was spent with the patient and / or family counseling and / or coordination of care  A description of the counseling / coordination of care:     Impressions, Diagnostic results, Prognosis, Instructions for management, Risks and benefits of treatment, Patient and family education, Risk factor reductions and Importance of compliance with treatment    Today I reviewed the patient's compliance data  he has been able to use the equipment 90% of all days recorded  Average usage was 4 or more hours 74% of all days recorded  The estimated AHI is 1 6 abnormal breathing events per hour  The patient feels they benefit from the use of PAP equipment and would like to continue PAP therapy  Response to treatment has been good  Supplies have been ordered for the next year  He was encouraged to change his cushions more often, which may make use more comfortable  Lab testing did not indicate low ferritin or magnesium levels  He does not wish to take any medication for restless legs at this time  We discussed that low vitamin D levels can worsen symptoms  He does not currently take any vitamin D supplement  He may consider using vitamin D3, 4000 units daily to see if it helps    He will continue using this equipment at the settings noted above for the next 12 months  At that timehe will then return for a routine follow-up evaluation  I have asked the patient to contact the [address] if he encounters any difficulties prior to that time  The following instructions have been given to the patient today:    Patient Instructions   1  Continue use of CPAP equipment nightly  2  Continue to clean your equipment, as discussed  3  Contact the [address] with any questions or concerns prior to your next visit, as needed  4  Schedule visit for follow-up in 1 year  5  You may consider adding vitamin D3 2000 units daily and 4000 units from November thru April      Nursing Support:  When: Monday through Friday 7A-5PM except holidays  Where: Our direct line is [phone]  If you are having a true emergency please call 911  In the event that the line is busy or it is after hours please leave a voice message and we will return your call  Please speak clearly, leaving your full name, birth date, best number to reach you and the reason for your call  Medication refills: We will need the name of the medication, the dosage, the ordering provider, whether you get a 30 or 90 day refill, and the pharmacy name and address  Medications will be ordered by the provider only  Nurses cannot call in prescriptions  Please allow 7 days for medication refills  Physician requested updates: If your provider requested that you call with an update after starting medication, please be ready to provide us the medication and dosage, what time you take your medication, the time you attempt to fall asleep, time you fall asleep, when you wake up, and what time you get out of bed  Sleep Study Results: We will contact you with sleep study results and/or next steps after the physician has reviewed your testing        Kayleen Felton, [address] [place] [address] 1

## 2022-10-11 ENCOUNTER — TELEPHONE (OUTPATIENT)
Dept: UROLOGY | Facility: AMBULATORY SURGERY CENTER | Age: 67
End: 2022-10-11

## 2022-10-11 DIAGNOSIS — Z12.5 ENCOUNTER FOR SCREENING FOR MALIGNANT NEOPLASM OF PROSTATE: ICD-10-CM

## 2022-10-11 DIAGNOSIS — Z87.898 HISTORY OF ELEVATED PSA: Primary | ICD-10-CM

## 2022-12-12 ENCOUNTER — APPOINTMENT (OUTPATIENT)
Dept: LAB | Facility: MEDICAL CENTER | Age: 67
End: 2022-12-12

## 2022-12-12 DIAGNOSIS — Z12.5 ENCOUNTER FOR SCREENING FOR MALIGNANT NEOPLASM OF PROSTATE: ICD-10-CM

## 2022-12-12 DIAGNOSIS — Z87.898 HISTORY OF ELEVATED PSA: ICD-10-CM

## 2022-12-12 LAB — PSA SERPL-MCNC: <0.1 NG/ML (ref 0–4)

## 2023-01-19 PROBLEM — Z98.890 HX OF COLONOSCOPY: Status: ACTIVE | Noted: 2021-02-11

## 2023-01-26 RX ORDER — IMIQUIMOD 12.5 MG/.25G
CREAM TOPICAL
COMMUNITY
Start: 2023-01-25

## 2023-02-02 ENCOUNTER — OFFICE VISIT (OUTPATIENT)
Dept: UROLOGY | Facility: MEDICAL CENTER | Age: 68
End: 2023-02-02

## 2023-02-02 VITALS
DIASTOLIC BLOOD PRESSURE: 80 MMHG | WEIGHT: 243 LBS | HEIGHT: 78 IN | SYSTOLIC BLOOD PRESSURE: 120 MMHG | HEART RATE: 54 BPM | BODY MASS INDEX: 28.11 KG/M2

## 2023-02-02 DIAGNOSIS — Z85.46 HISTORY OF PROSTATE CANCER: ICD-10-CM

## 2023-02-02 DIAGNOSIS — N52.31 ERECTILE DYSFUNCTION AFTER RADICAL PROSTATECTOMY: ICD-10-CM

## 2023-02-02 DIAGNOSIS — Z87.898 HISTORY OF ELEVATED PSA: Primary | ICD-10-CM

## 2023-02-02 LAB
SL AMB  POCT GLUCOSE, UA: NORMAL
SL AMB LEUKOCYTE ESTERASE,UA: NORMAL
SL AMB POCT BILIRUBIN,UA: NORMAL
SL AMB POCT BLOOD,UA: NORMAL
SL AMB POCT CLARITY,UA: CLEAR
SL AMB POCT COLOR,UA: YELLOW
SL AMB POCT KETONES,UA: NORMAL
SL AMB POCT NITRITE,UA: NORMAL
SL AMB POCT PH,UA: 5.5
SL AMB POCT SPECIFIC GRAVITY,UA: 1.01
SL AMB POCT URINE PROTEIN: NORMAL
SL AMB POCT UROBILINOGEN: 0.2

## 2023-02-02 NOTE — PROGRESS NOTES
2/2/2023      Chief Complaint   Patient presents with   • Prostate Cancer     [address]      Assessment and Plan    80 y o  male managed by our office; previously Dr Hadley Conception  1  Prostate Cancer   · Radical prostatectomy 2015  · PSA performed 12/12/2022 resulted less than 0 1  · Repeat PSA in 1 year    2  Erectile Dysfunction   · Increase Trimix to 100-3-30-prescription sent to pharmacy      History of Present Illness  Miquel Mahan is a 80 y o  male here for follow up evaluation of prostate cancer status post robotic prostatectomy 5/4/2015 for [place] 7 (4+3) and 10% of the tissue  Margins were negative  On evaluation today, patient with complaints of continued increased urinary frequency  He also reports erectile dysfunction for which she has been managed with Trimix  He reports no significant change to his general health  He denies dysuria and hematuria  Review of Systems   Constitutional: Negative for chills and fever  Respiratory: Negative for cough and shortness of breath  Cardiovascular: Negative for chest pain  Gastrointestinal: Negative for abdominal distention, abdominal pain, blood in stool, nausea and vomiting  Genitourinary: Positive for urgency  Negative for difficulty urinating, dysuria, enuresis, flank pain, frequency and hematuria  Skin: Negative for rash  AUA SYMPTOM SCORE    Flowsheet Row Most Recent Value   AUA SYMPTOM SCORE    How often have you had a sensation of not emptying your bladder completely after you finished urinating? 3   How often have you had to urinate again less than two hours after you finished urinating? 3   How often have you found you stopped and started again several times when you urinate? 1   How often have you found it difficult to postpone urination? 1   How often have you had a weak urinary stream? 1   How often have you had to push or strain to begin urination?  1   How many times did you most typically get up to urinate from the time you went to bed at night until the time you got up in the morning? 1   Quality of Life: If you were to spend the rest of your life with your urinary condition just the way it is now, how would you feel about that? 2   AUA SYMPTOM SCORE 11             Past Medical History  Past Medical History:   Diagnosis Date   • Balanitis    • BPH with urinary obstruction    • Elevated PSA    • Erectile dysfunction following radical prostatectomy    • Esophagitis    • Gastritis    • GERD (gastroesophageal reflux disease)    • Hypercholesteremia    • Malignant neoplasm prostate ([address] )    • Restless leg syndrome    • Sleep apnea    • Sore muscles    • Urinary incontinence        Past Social History  Past Surgical History:   Procedure Laterality Date   • COLONOSCOPY  11/10/2015    ISAURA Gonzalez MD -The entire examined colon is normal    • COLONOSCOPY  02/23/2006    ISAURA Rodriguez MD - Hemorrhoids, two polyps   • COLONOSCOPY  02/10/2021    ISAURA Gonzalez MD - The entire examined colon is normal    • EGD  06/02/2020    EGD w/ Bx: ISAURA Gonzalez MD -Normal examined duodenum, Gastritis, Z-line irregular, Normal esophagus  Bx: negative for ulceration and dysplasia, negative for H  pylori, Negative for [place] esophageal intestinal metaplasia  • EGD  07/31/2017    EGD w/ Bx: ISAURA Gonzalez MD-Normal examined duodenum, Gastritis, Z-line irregular, Otherwise normal examination  Bx: Negative for H  pylori   • EGD  04/09/2014    EGD w/ Bx: YOSHI Gonzalez MD -Normal Bx: Negative for H  pylori   • EGD  01/09/2008    EGD w/ Bx: ISAURA Rodriguez MD - Normal Bx: negative for H  pylori   • EGD AND COLONOSCOPY  10/08/2010    ISAURA Gonzalez MD -Mild erosive esophagitis, mild eroxive gastritis, normal colonoscopy   Bx: negative for H  pylori   • LAPAROSCOPY W/ TOTAL BILATERAL PELVIC AND JOSEPH-AORTIC LYMPHADENECTOMY Bilateral 05/04/2015   • PROSTATE BIOPSY Bilateral 2005, 2010   • PROSTATECTOMY N/A 05/04/2015     Social History     Tobacco Use Smoking Status Never   Smokeless Tobacco Never       Past Family History  Family History   Problem Relation Age of Onset   • Emphysema Father    • Heart disease Father    • Testicular cancer Brother        Past Social history  Social History     Socioeconomic History   • Marital status: /Civil Union     Spouse name: Not on file   • Number of children: Not on file   • Years of education: Not on file   • Highest education level: Not on file   Occupational History   • Occupation: retired-consultant   Tobacco Use   • Smoking status: Never   • Smokeless tobacco: Never   Vaping Use   • Vaping Use: Never used   Substance and Sexual Activity   • Alcohol use: Yes     Alcohol/week: 14 0 - 21 0 standard drinks     Types: 14 - 21 Standard drinks or equivalent per week     Comment:     • Drug use: No   • Sexual activity: Not on file   Other Topics Concern   • Not on file   Social History Narrative    Drinks , 2 - 12 oz coffee (1/2 in the AM);   Drinks coffee 2-3 PM caffeinated      Social Determinants of Health     Financial Resource Strain: Not on file   Food Insecurity: Not on file   Transportation Needs: Not on file   Physical Activity: Not on file   Stress: Not on file   Social Connections: Not on file   Intimate Partner Violence: Not on file   Housing Stability: Not on file       Current Medications  Current Outpatient Medications   Medication Sig Dispense Refill   • famotidine (PEPCID) 40 MG tablet Take 1 tablet (40 mg total) by mouth daily 90 tablet 4   • Omega-3 Fatty Acids (fish oil) 1,000 mg Take 1,000 mg by mouth daily (Patient not taking: Reported on 3/13/2023)     • pantoprazole (PROTONIX) 40 mg tablet TAKE 1 TABLET BY MOUTH TWICE A DAY BEFORE MEALS 180 tablet 3   • Psyllium (KONSYL DAILY FIBER PO) Take by mouth     • rosuvastatin (CRESTOR) 5 mg tablet 1/2 tab po daily     • FLUTICASONE PROPIONATE, NASAL, NA into each nostril     • imiquimod (ALDARA) 5 % cream  (Patient not taking: Reported on 3/13/2023) No current facility-administered medications for this visit  Allergies  No Known Allergies      The following portions of the patient's history were reviewed and updated as appropriate: allergies, current medications, past medical history, past social history, past surgical history and problem list       Vitals  Vitals:    02/02/23 1547   BP: 120/80   BP Location: Left arm   Patient Position: Sitting   Cuff Size: Large   Pulse: (!) 54   Weight: 110 kg (243 lb)   Height: 6' 7" (2 007 m)           Physical Exam  Physical Exam  Vitals reviewed  Constitutional:       General: He is not in acute distress  Appearance: Normal appearance  He is normal weight  HENT:      Head: Normocephalic  Pulmonary:      Effort: No respiratory distress  Breath sounds: Normal breath sounds  Skin:     General: Skin is warm and dry  Neurological:      General: No focal deficit present  Mental Status: He is alert and oriented to person, place, and time     Psychiatric:         Mood and Affect: Mood normal          Behavior: Behavior normal            Results  No results found for this or any previous visit (from the past 1 hour(s)) ]  Lab Results   Component Value Date    PSA <0 1 12/12/2022    PSA <0 1 04/13/2020    PSA <0 1 06/05/2019     No results found for: GLUCOSE, CALCIUM, NA, K, CO2, CL, BUN, CREATININE  No results found for: WBC, HGB, HCT, MCV, PLT        Orders  Orders Placed This Encounter   Procedures   • PSA Total, Diagnostic     Standing Status:   Future     Standing Expiration Date:   2/2/2024   • POCT urine dip auto non-scope       TISH Henderson

## 2023-02-02 NOTE — PATIENT INSTRUCTIONS
PSA in 1 year   Will increase Trimix dose   IF increased Trimix dose helps to get the erection up but does not  maintain then consider the use of a constricting band/cock ring

## 2023-03-13 ENCOUNTER — OFFICE VISIT (OUTPATIENT)
Dept: SLEEP CENTER | Facility: CLINIC | Age: 68
End: 2023-03-13

## 2023-03-13 VITALS
HEIGHT: 78 IN | WEIGHT: 248 LBS | HEART RATE: 77 BPM | SYSTOLIC BLOOD PRESSURE: 135 MMHG | BODY MASS INDEX: 28.69 KG/M2 | DIASTOLIC BLOOD PRESSURE: 71 MMHG

## 2023-03-13 DIAGNOSIS — G47.33 OSA (OBSTRUCTIVE SLEEP APNEA): Primary | ICD-10-CM

## 2023-03-13 NOTE — PATIENT INSTRUCTIONS
You are doing great with use of your machine     On your prior diagnostic sleep study you had 14 7 times per hour of sleep apnea events  With your machine, you have 1 times per hour of sleep apnea episodes  The goal is to use CPAP all night long, more usage= more health benefit and more symptomatic improvement  If any barriers or problems arise that lead to difficulty using your machine, please let me know either through a [employer] message or alternatively, by calling my office  I will order you a new CPAP machine, the Resmed Airsense 11     Nursing Support:  When: Monday through Friday 7A-5PM except holidays  Where: Our direct line is [phone]  If you are having a true emergency please call 911  In the event that the line is busy or it is after hours please leave a voice message and we will return your call  Please speak clearly, leaving your full name, birth date, best number to reach you and the reason for your call  Medication refills: We will need the name of the medication, the dosage, the ordering provider, whether you get a 30 or 90 day refill, and the pharmacy name and address  Medications will be ordered by the provider only  Nurses cannot call in prescriptions  Please allow 7 days for medication refills  Physician requested updates: If your provider requested that you call with an update after starting medication, please be ready to provide us the medication and dosage, what time you take your medication, the time you attempt to fall asleep, time you fall asleep, when you wake up, and what time you get out of bed  Sleep Study Results: We will contact you with sleep study results and/or next steps after the physician has reviewed your testing

## 2023-03-13 NOTE — PROGRESS NOTES
Assessment/Plan:      1  MADELEINE (obstructive sleep apnea)  -     CPAP Auto New DME     Overall, Mr Tisha Retana is doing very well, he is consistently using CPAP and his AHI is normal, treatment is beneficial and effective  As we discussed previously, he does not like using CPAP but finds it helpful and continues to use it  He likely is not a candidate for a hypoglossal nerve stimulator or would at least require a repeat study  That said, a hypoglossal nerve stimulator would not be indicated if someone is as compliant with CPAP as he is  Therefore, I would recommend continued use of CPAP  His current machine is over 11years old I believe so I have ordered a new machine today  If he receives a new machine I will see him back for a visit in 1-3 months  If he is not eligible for machine, I will see him back in 1 year  We discussed the so clean machine, I recommend in general avoiding use of this type of machine, it is better to clean the CPAP machine regularly per the 's instructions  He has had periodic limb movements during sleep in the past, has not had significant benefit from medication  Iron panel has been normal    Unfortunately, there are no new options I would recommend to better control this  Subjective:      Patient ID: Darcie Sanchez is a 80 y o  male  This is a 55-year-old male who returns in a follow-up visit, last seen 1 year ago  He has a history of obstructive sleep apnea, periodic limb movements during sleep, and rare restless legs  Most recent sleep study in 2014 showed a respiratory event index of 14 7  He has been treated with CPAP  Usually does not snore with CPAP, snores a lot more without it  He has been going to bed at 1130-12 AM , asleep in 30 minutes, he has several awakenings during the night and ultimately is awake at 830  He has an [place] Sleepiness Scale score of 1   Usually returns to sleep easily, does not put CPAP on after the last awakening and feels he sleeps better without it    He uses CPAP on a regular basis  Does not like CPAP but uses it regularly    Late afternoon at 3-4 PM feels tired  Drinks coffee at that time    PAP Data  Airsense 10 auto set at 9 cm h20  AHI 1 3  Average session 5 hr 49 min   Used 30/30 nights  Uses FFM      + dry mouth  Mild nasal bleeds since using fluticasone  Finds it hard to breath nasally     [place] Sleepiness Scale:     Sitting and reading: Would never doze  Watching TV: Would never doze  Sitting, inactive in a public place (e g  a theatre or a meeting):  Would never doze  As a passenger in a car for an hour without a break: Would never doze  Lying down to rest in the afternoon when circumstances permit: Slight chance of dozing  Sitting and talking to someone: Would never doze  Sitting quietly after a lunch without alcohol: Would never doze  In a car, while stopped for a few minutes in traffic: Would never doze  Total score: 1     The following portions of the patient's history were reviewed and updated as appropriate: allergies, current medications, past family history, past medical history, past social history, past surgical history, and problem list     Review of Systems    As above  Objective:        /71 (BP Location: Left arm, Patient Position: Sitting, Cuff Size: Large)   Pulse 77   Ht 6' 7" (2 007 m)   Wt 112 kg (248 lb)   BMI 27 94 kg/m²     Physical Exam   RRR  Lungs CTA b/l  mallampati 3 airway, wide tongue, no tonsillar hgypertrophy

## 2023-03-14 ENCOUNTER — TELEPHONE (OUTPATIENT)
Dept: SLEEP CENTER | Facility: CLINIC | Age: 68
End: 2023-03-14

## 2023-03-14 NOTE — TELEPHONE ENCOUNTER
Rx for CPAP and clinical information sent to [facility] via [facility]

## 2023-03-15 LAB

## 2023-07-10 ENCOUNTER — OFFICE VISIT (OUTPATIENT)
Dept: SLEEP CENTER | Facility: CLINIC | Age: 68
End: 2023-07-10
Payer: MEDICARE

## 2023-07-10 VITALS
HEIGHT: 78 IN | BODY MASS INDEX: 28.69 KG/M2 | SYSTOLIC BLOOD PRESSURE: 166 MMHG | HEART RATE: 89 BPM | WEIGHT: 248 LBS | DIASTOLIC BLOOD PRESSURE: 94 MMHG

## 2023-07-10 DIAGNOSIS — G25.81 RLS (RESTLESS LEGS SYNDROME): ICD-10-CM

## 2023-07-10 DIAGNOSIS — G47.33 OSA (OBSTRUCTIVE SLEEP APNEA): Primary | ICD-10-CM

## 2023-07-10 PROCEDURE — 99213 OFFICE O/P EST LOW 20 MIN: CPT | Performed by: PSYCHIATRY & NEUROLOGY

## 2023-07-10 NOTE — PATIENT INSTRUCTIONS
I would recommend a repeat sleep study.   To do so, It would be best to stop CPAP for several days beforehand, then you can resume right after the test    After the test I will the order you a new machine

## 2023-07-10 NOTE — PROGRESS NOTES
Assessment/Plan:    1. MADELEINE (obstructive sleep apnea)  -     Diagnostic Sleep Study; Future; Expected date: 07/11/2023    2. RLS (restless legs syndrome)    We discussed that in reviewing his previous sleep study, it was not scored to Medicare standard, a 3% desaturation was used to score hypopneas where his Medicare requires a 4% desaturation with hypopneas. Therefore, qualify for a machine under Medicare, he needs to have a repeat sleep study, this was ordered today. We discussed that his sleep quality is not ideal, he still has some degree of tiredness. In discussing his history, he drinks alcohol on a near daily basis, we discussed that it is quite possible alcohol is leading to sleep fragmentation and worsening sleep quality. I recommended he consider tapering and eventually discontinuing alcohol to see if his sleep quality improves. In the past he had periodic limb movements during sleep and some degree of restless legs, his last ferritin seemed quite good and this has been a chronic problem so I did not repeat this today. Certainly another trial of gabapentin could be considered but given his alcohol use, agree with the patient that that would not be ideal.    I will see him back in a follow-up visit after he receives a new CPAP machine at home. Subjective:      Patient ID: Afua Fu is a 80 y.o. male. HPI  Mr. Cornell Garcia returns in a follow-up visit, he has a history of obstructive sleep apnea treated with CPAP. He has a history of obstructive sleep apnea, periodic limb movements during sleep, and rare restless legs. Most recent sleep study in 2014 showed a respiratory event index of 14.7 (scored at the 3% rule). I had ordered him a new CPAP machine but he did not receive it as his old test was not scored per Medicare's standards, he was told that a repeat sleep study is needed to qualify for machine under Medicare. 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      He is going to bed at midnight, asleep in 45 minutes, awake at 8 AM, sleeping 4-5 hours a night. [place] Sleepiness Scale score is 4. He denies drowsy driving. CPAP data reviewed today-AirSense 10 set at 9 cm H2O  Average session 5 hours 52 minutes, usage 30 out of 30 days  AHI 1.3    Has some RLS and leg movements in sleep.   Last ferritin 233 10/21 , had tried gabapentin - did not help sleep much    Caffeine - 1 cup caffeine a day and 2 cups with 1/4 caffeine   Alcohol- daily, 2-3 drinks a night         [place] Sleepiness Scale  Sitting and reading: Would never doze  Watching TV: Would never doze  Sitting, inactive in a public place (e.g. a theatre or a meeting): Slight chance of dozing  As a passenger in a car for an hour without a break: Would never doze  Lying down to rest in the afternoon when circumstances permit: Moderate chance of dozing  Sitting and talking to someone: Would never doze  Sitting quietly after a lunch without alcohol: Slight chance of dozing  In a car, while stopped for a few minutes in traffic: Would never doze  Total score: 4      Review of Systems    As above  Objective:      /94 (BP Location: Left arm, Patient Position: Sitting, Cuff Size: Adult)   Pulse 89   Ht 6' 7" (2.007 m)   Wt 112 kg (248 lb)   BMI 27.94 kg/m²          Physical Exam    RRR  Lungs CTA b/l  No edema

## 2023-07-13 ENCOUNTER — HOSPITAL ENCOUNTER (OUTPATIENT)
Dept: SLEEP CENTER | Facility: CLINIC | Age: 68
Discharge: HOME | End: 2023-07-13
Payer: MEDICARE

## 2023-07-13 DIAGNOSIS — G47.33 OSA (OBSTRUCTIVE SLEEP APNEA): ICD-10-CM

## 2023-07-13 PROCEDURE — 95810 POLYSOM 6/> YRS 4/> PARAM: CPT

## 2023-07-14 NOTE — PROGRESS NOTES
Sleep Study Documentation    Pre-Sleep Study       Sleep testing procedure explained to patient:YES    Patient napped prior to study:NO    Caffeine:Dayshift worker after 12PM.  Caffeine use:YES- coffee  18 ounces or more    Alcohol:Dayshift workers after 5PM: Alcohol use:NO    Typical day for patient:YES       Study Documentation    Sleep Study Indications: HX MADELEINE    Sleep Study: Diagnostic   Snore: Moderate  Supplemental O2: no      Minimum SaO2 87  Baseline SaO2 98      EKG abnormalities: no     EEG abnormalities: no    Sleep Study Recorded < 2 hours: N/A    Sleep Study Recorded > 2 hours but incomplete study: N/A    Sleep Study Recorded 6 hours but no sleep obtained: NO    Patient classification: employed       Post-Sleep Study    Medication used at bedtime or during sleep study:NO    Patient reports time it took to fall asleep:30 to 60 minutes    Patient reports waking up during study:3 or more times. Patient reports returning to sleep in 10 to 30 minutes. Patient reports sleeping 4 to 6 hours without dreaming. Patient reports sleep during study:worse than usual    Patient rated sleepiness: Somewhat sleepy or tired    PAP treatment:no.

## 2023-07-24 ENCOUNTER — TELEPHONE (OUTPATIENT)
Dept: SLEEP CENTER | Facility: CLINIC | Age: 68
End: 2023-07-24

## 2023-07-24 DIAGNOSIS — G47.33 OSA (OBSTRUCTIVE SLEEP APNEA): Primary | ICD-10-CM

## 2023-07-24 DIAGNOSIS — F51.04 CHRONIC INSOMNIA: ICD-10-CM

## 2023-07-24 LAB

## 2023-07-24 NOTE — TELEPHONE ENCOUNTER
Called patient and advised of sleep study results and order for CPAP. Offered to schedule compliance follow up with Dr. Ivan Medina but patient stated he will call back later to schedule. He is currently on the other line with another call. Rx for CPAP and clinical information sent to [facility] via [employer].

## 2023-07-24 NOTE — TELEPHONE ENCOUNTER
Dr. Oleg Brown, per message received via [address] from Jennifer Dove at [facility], they require a new Rx dated after the new sleep study date. Please write new script for CPAP. Once new script written will send order to [facility] via [address]. Need to include office note prior to new study and sleep study results. Will then contact patient to review study results and schedule compliance follow up appointment.

## 2023-07-24 NOTE — TELEPHONE ENCOUNTER
----- Message from Delmis Bashir MD sent at 7/23/2023  8:48 PM EDT -----  New CPAP was ordered a few months ago, this test was needed by DME to authorize new machine, Can you send to his DME and let him know to followup with them?   I did not re-write the order as I do not think it is needed

## 2023-09-06 ENCOUNTER — TELEPHONE (OUTPATIENT)
Dept: SLEEP CENTER | Facility: CLINIC | Age: 68
End: 2023-09-06

## 2023-10-30 ENCOUNTER — EVALUATION (OUTPATIENT)
Dept: PHYSICAL THERAPY | Facility: CLINIC | Age: 68
End: 2023-10-30
Payer: MEDICARE

## 2023-10-30 DIAGNOSIS — M12.811 ROTATOR CUFF ARTHROPATHY OF RIGHT SHOULDER: Primary | ICD-10-CM

## 2023-10-30 PROCEDURE — 97161 PT EVAL LOW COMPLEX 20 MIN: CPT | Performed by: PHYSICAL THERAPIST

## 2023-10-30 PROCEDURE — 97110 THERAPEUTIC EXERCISES: CPT | Performed by: PHYSICAL THERAPIST

## 2023-10-30 NOTE — LETTER
"[facility] Intensive Care [facility] Medicine  Progress Note    Patient Name: Lola Wetzel  MRN: [id number]  Patient Class: IP- Inpatient   Admission Date: 6/28/2022  Length of Stay: 3 days  Attending Physician: Nba Johnson MD  Primary Care Provider: Janet Alaniz DO        Subjective:     Principal Problem:Acute pancreatitis        HPI:  Per Ileana Uribe, DNP:    "Mr Lola is a 36 yr old male with PMH that includes insomnia and bipolar 1 disorder. He came to the ED with abdominal pain, nausea, vomiting, and reported alcohol withdrawals. Pt also reports gaining 50 lbs over the past 6 months. He has been having severe insomnia for the past 30 days due to multiple stressors in his life. He was taking seroquel but it has not been working. He drinks immeasurable amounts of non-specific alcoholic beverages daily. He has not been able to keep anything down for the past two days. Pain is located in his left upper quadrant and is worse with palpation. The pain is described as cramping and started two days ago. Pt has had decreased intake and output over the past few days. He reports episodes of chills and sweats at home. Pt has allergy to ativan but is able to take diazepam. He has been admitted to hospital medicine and placed in the ICU."      Overview/Hospital Course:  Patient is a 36-year-old man with evidence of severe acute pancreatitis secondary to severe triglyceridemia and alcohol abuse. Patient presented with fever, leukocytosis, and severe lactic acidosis.  Severe inflammatory response likely due to acute pancreatitis without convincing evidence of a source of infection at this time.  Patient treated aggressive isotonic intravenous fluid resuscitation.  Lactic acidosis improving with volume resuscitation.       Patient with significant agitation overnight likely related to confusion related to metabolic encephalopathy and also alcohol withdrawal.  Patient started continuous " 2023    Stu Oro MD  [address] [place] [id number]    Patient: Javier Virgen   YOB: 1955   Date of Visit: 10/30/2023     Encounter Diagnosis     ICD-10-CM    1. Rotator cuff arthropathy of right shoulder  M12.811           Dear Dr. Kayla Benson: Thank you for your recent referral of Javier Virgen. Please review the attached evaluation summary from Brent's recent visit. Please verify that you agree with the plan of care by signing the attached order. If you have any questions or concerns, please do not hesitate to call. I sincerely appreciate the opportunity to share in the care of one of your patients and hope to have another opportunity to work with you in the near future. Sincerely,    Wendell Dakins, PT      Referring Provider:      I certify that I have read the below Plan of Care and certify the need for these services furnished under this plan of treatment while under my care. Stu Oro MD  [address] [place] [id number]  Via Fax: [phone]          PT Evaluation     Today's date: 10/30/2023  Patient name: Javier Virgen  : 1955  MRN: [id number]  Referring provider: Stu Oro MD  Dx:   Encounter Diagnosis     ICD-10-CM    1. Rotator cuff arthropathy of right shoulder  M12.811              Stop Time: 1138       Assessment  Assessment details: Patient is a 80 y.o. male who  presents with R shoulder pain and weakness associated with a rotator cuff tear. He has functional limitations as a result of impairments. Patient would benefit from course of skilled physical therapy to address above listed impairments in an effort to improve function. Understanding of Dx/Px/POC: excellent  Goals  Short Term Goals:  1) Pain : Decrease shoulder pain to 2/10 at worst x 1 continuous week within 2-3 weeks.   2) Strength: Improved UE strength by at least 1/2 grade for all noted as dexmedetomidine with improvement in mental status/agitation. Psychiatry service consulted and recommended PEC due to grave debility related to substance abuse and concern for underlying mental illness.       Patient also started on continuous infusion to treat severe hypertriglyceridemia.   Patient abdominal pain improving and now tolerating clear liquids.      Interval History: Triglycerides improved then worsen again but rending down again.  Evidence of hypervolemia.    Review of Systems   Constitutional:  Negative for chills and fever.   Respiratory:  Negative for shortness of breath.    Cardiovascular:  Negative for chest pain.   Gastrointestinal:  Positive for abdominal distention. Negative for abdominal pain, constipation, diarrhea, nausea and vomiting.   Psychiatric/Behavioral:  Positive for agitation.      Objective:     Vital Signs (Most Recent):  Temp: 99 °F (37.2 °C) (07/01/22 1501)  Pulse: 99 (07/01/22 1801)  Resp: (!) 31 (07/01/22 1801)  BP: (!) 145/72 (07/01/22 1801)  SpO2: 95 % (07/01/22 1801)   Vital Signs (24h Range):  Temp:  [97.6 °F (36.4 °C)-99.3 °F (37.4 °C)] 99 °F (37.2 °C)  Pulse:  [] 99  Resp:  [16-39] 31  SpO2:  [94 %-99 %] 95 %  BP: (113-151)/(65-98) 145/72     Weight: (!) 164.2 kg (361 lb 15.9 oz)  Body mass index is 45.25 kg/m².    Intake/Output Summary (Last 24 hours) at 7/1/2022 2021  Last data filed at 7/1/2022 1740  Gross per 24 hour   Intake 1660.6 ml   Output 3600 ml   Net -1939.4 ml        Physical Exam  Constitutional:       Appearance: He is obese. He is ill-appearing.   Cardiovascular:      Rate and Rhythm: Normal rate and regular rhythm.      Heart sounds: Normal heart sounds. No murmur heard.    No friction rub. No gallop.   Pulmonary:      Effort: Pulmonary effort is normal. No respiratory distress.      Breath sounds: Normal breath sounds. No wheezing or rales.   Abdominal:      General: Bowel sounds are decreased. There is distension.      Palpations: Abdomen is soft.  weak within 2-3 weeks. 3) Function: Improved FOTO score from IE within 2-3 weeks (65@ IE), patient to note greater use of arm for ADLs like dressing and bathing within 2-3 weeks. LongTerm Goals:  1) Pain : Eliminate shoulder pain  x 1 continuous week within 6 weeks. 2) Strength: Improved UE strength to 5/5 within 6 weeks. 3) Function: Improved FOTO score to at least 71 ; no reported difficulty with ADLs as they pertain to shoulder within 6 weeks. 4) (I) with HEP within 6 weeks        Plan  Patient would benefit from: skilled PT  Planned modality interventions: cryotherapy, TENS, thermotherapy: hydrocollator packs and ultrasound  Planned therapy interventions: ADL training, body mechanics training, functional ROM exercises, home exercise program, joint mobilization, manual therapy, neuromuscular re-education, patient education, postural training, strengthening, stretching, therapeutic activities and therapeutic exercise  Frequency: 1 x's per week x 4-6 weeks. Treatment plan discussed with: patient      Subjective Evaluation    History of Present Illness  Mechanism of injury: Patient is a 80 y.o. old male who presents for an initial outpatient physical therapy consultation regarding his R shoulder pain. Has known (B) rotator cuff tearing via MRI. Treated in therapy for L shoulder pain in 2019. Has been going to gym at least 3 days a week. R shoulder feeling weaker especially with reaching activities above shoulder level. Can't throw a tennis ball. Recent consultation with Dr. Nathaniel Al. Referred for physical therapy.     Patient Goals  Patient goals for therapy: decreased pain, increased strength, independence with ADLs/IADLs and return to sport/leisure activities  Patient goal: to learn the best exercises to do in gym and at home  Pain  Current pain ratin  At worst pain rating: 3  Location: R anterolateral shoulder    Hand dominance: right        Objective    Right Shoulder AROM  Normal Value   Musculoskeletal:         General: No tenderness. Normal range of motion.   Skin:     General: Skin is warm and dry.      Coloration: Skin is not pale.      Findings: No erythema or rash.   Neurological:      Mental Status: He is alert and oriented to person, place, and time.       Significant Labs: All pertinent labs within the past 24 hours have been reviewed.    Significant Imaging: I have reviewed all pertinent imaging results/findings within the past 24 hours.      Assessment/Plan:      * Acute pancreatitis  Severe acute pancreatitis secondary to alcohol abuse and severe hypertriglyceridemia.  Ultrasound negative for biliary obstruction.  Serial triglycerides trending down and then up again.  Continue continuous insulin infusion. Continue fenofibrate.  Evidence of hypervolemia.  Treated with diuretic with good urine output.  Discussed with pulmonary critical care and pathology.  Will hold off on plasmapheresis for now and give insulin infusion and fenofibrate more time to control hypertriglyceridemia with goal of lowering triglycerides less than <500 mg/dL. Started on low fat diet and tolerating some oral intake.  Continue antiemetics and pain medication as needed.    Acute metabolic encephalopathy  Secondary to severe inflammatory response from acute pancreatitis, alcohol abuse/withdrawal, and possibly underlying mental illness.  Agitation improved.  Continue to wean dexmedetomidine infusion.  As needed benzodiazepines for withdrawal symptoms.  Psychiatry evaluated patient recommended physician emergency certificate (PEC).  Continue PEC for now.    Lactic acidosis  Likely secondary to severe acute pancreatitis.  Resolving with volume resuscitation.    Hypertriglyceridemia  Continue to correct marked hypertriglyceridemia with continuous insulin infusion and fenofibrate.    Alcohol dependence with withdrawal  Improivng.  Continue to treat withdrawal symptoms as needed.      VTE Risk Mitigation (From admission,  PROM  Normal Value Strength Normal Value   Shoulder Flexion 170 180 175 180 5 5   Shoulder Abduction 170 180 175 180 4+ 5   Shoulder External Rotation 85 90 90 90 4+ 5   Shoulder Internal Rotation T7 T7 65 70 5 5                      Strength        Rhomboids 4+ 5       Lower Trapezius 4 5       Serratus Anterior 4 5         Drop Arm (-)  Empty Can (-)    (+) scapular dumping (B) indicative of periscapular weakness.       Flowsheet Rows      Flowsheet Row Most Recent Value   PT/OT G-Codes    Current Score 65   Projected Score 71               Precautions: history of prostate cancer       Manuals 10/30/23                                                                Neuro Re-Ed                                                                                                        Ther Ex             UBE NV            Tband armand ER walkouts NV            Ball circles at wall NV            Pball rhomboids (if he has ball at home) NV            Pball lower traps (if he has ball at home) NV                                                   Ther Activity                                       Gait Training                                       Modalities                          IE/HEP RG onward)         Ordered     enoxaparin injection 40 mg  Daily         06/30/22 0846     IP VTE HIGH RISK PATIENT  Once         06/28/22 2003     Place sequential compression device  Until discontinued         06/28/22 2003              Critical care time spend on evaluation and treatment to stabilize severe organ dysfunction including reviewing pertinent labs and imaging studies, discussing care with consulting physicians and nursing staff.  40 minutes critical care time.      Nba Johnson MD  Department of Hospital Medicine   [facility] Intensive Care [facility]

## 2023-10-30 NOTE — PROGRESS NOTES
PT Evaluation     Today's date: 10/30/2023  Patient name: Lexy Em  : 1955  MRN: [id number]  Referring provider: Gustavus Pallas, MD  Dx:   Encounter Diagnosis     ICD-10-CM    1. Rotator cuff arthropathy of right shoulder  M12.811              Stop Time: 1138       Assessment  Assessment details: Patient is a 80 y.o. male who  presents with R shoulder pain and weakness associated with a rotator cuff tear. He has functional limitations as a result of impairments. Patient would benefit from course of skilled physical therapy to address above listed impairments in an effort to improve function. Understanding of Dx/Px/POC: excellent  Goals  Short Term Goals:  1) Pain : Decrease shoulder pain to 2/10 at worst x 1 continuous week within 2-3 weeks. 2) Strength: Improved UE strength by at least 1/2 grade for all noted as weak within 2-3 weeks. 3) Function: Improved FOTO score from IE within 2-3 weeks (65@ IE), patient to note greater use of arm for ADLs like dressing and bathing within 2-3 weeks. LongTerm Goals:  1) Pain : Eliminate shoulder pain  x 1 continuous week within 6 weeks. 2) Strength: Improved UE strength to 5/5 within 6 weeks. 3) Function: Improved FOTO score to at least 71 ; no reported difficulty with ADLs as they pertain to shoulder within 6 weeks. 4) (I) with HEP within 6 weeks        Plan  Patient would benefit from: skilled PT  Planned modality interventions: cryotherapy, TENS, thermotherapy: hydrocollator packs and ultrasound  Planned therapy interventions: ADL training, body mechanics training, functional ROM exercises, home exercise program, joint mobilization, manual therapy, neuromuscular re-education, patient education, postural training, strengthening, stretching, therapeutic activities and therapeutic exercise  Frequency: 1 x's per week x 4-6 weeks.   Treatment plan discussed with: patient      Subjective Evaluation    History of Present Illness  Mechanism of injury: Patient is a 80 y.o. old male who presents for an initial outpatient physical therapy consultation regarding his R shoulder pain. Has known (B) rotator cuff tearing via MRI. Treated in therapy for L shoulder pain in 2019. Has been going to gym at least 3 days a week. R shoulder feeling weaker especially with reaching activities above shoulder level. Can't throw a tennis ball. Recent consultation with Dr. Amor Olivas. Referred for physical therapy. Patient Goals  Patient goals for therapy: decreased pain, increased strength, independence with ADLs/IADLs and return to sport/leisure activities  Patient goal: to learn the best exercises to do in gym and at home  Pain  Current pain ratin  At worst pain rating: 3  Location: R anterolateral shoulder    Hand dominance: right        Objective    Right Shoulder AROM  Normal Value PROM  Normal Value Strength Normal Value   Shoulder Flexion 170 180 175 180 5 5   Shoulder Abduction 170 180 175 180 4+ 5   Shoulder External Rotation 85 90 90 90 4+ 5   Shoulder Internal Rotation T7 T7 65 70 5 5                      Strength        Rhomboids 4+ 5       Lower Trapezius 4 5       Serratus Anterior 4 5         Drop Arm (-)  Empty Can (-)    (+) scapular dumping (B) indicative of periscapular weakness.       Flowsheet Rows      Flowsheet Row Most Recent Value   PT/OT G-Codes    Current Score 65   Projected Score 71               Precautions: history of prostate cancer       Manuals 10/30/23                                                                Neuro Re-Ed                                                                                                        Ther Ex             UBE NV            Tband armand ER walkouts NV            Ball circles at wall NV            Pball rhomboids (if he has ball at home) NV            Pball lower traps (if he has ball at home) NV                                                   Ther Activity Gait Training                                       Modalities                          IE/HEP RG

## 2023-11-09 ENCOUNTER — OFFICE VISIT (OUTPATIENT)
Dept: PHYSICAL THERAPY | Facility: CLINIC | Age: 68
End: 2023-11-09
Payer: MEDICARE

## 2023-11-09 DIAGNOSIS — M12.811 ROTATOR CUFF ARTHROPATHY OF RIGHT SHOULDER: Primary | ICD-10-CM

## 2023-11-09 PROCEDURE — 97110 THERAPEUTIC EXERCISES: CPT | Performed by: PHYSICAL THERAPIST

## 2023-11-09 NOTE — PROGRESS NOTES
Daily Note     Today's date: 2023  Patient name: Javier Virgen  : 1955  MRN: [id number]  Referring provider: Stu Oro MD  Dx:   Encounter Diagnosis     ICD-10-CM    1. Rotator cuff arthropathy of right shoulder  M12.811              Stop Time: 1344       Subjective: Has been doing home and gym program as instructed in last visit. R shoulder feeling bit stronger, but still weakness as compared to L Side. Objective: See treatment diary below. Progressed UE strengthening program and reviewed HEP. He was using too much weight in gym for exercises. Discussed more appropriate levels of resistance. Assessment: Tolerated treatment well. Patient demonstrated fatigue post treatment, exhibited good technique with therapeutic exercises, and would benefit from continued PT      Plan: Continue per plan of care. Likely one more visit.      Precautions: history of prostate cancer       Manuals 10/30/23 11/9                                                               Neuro Re-Ed                                                                                                        Ther Ex             UBE NV 3/3           Tband armand ER walkouts NV Green  2 x 10           Ball circles at wall NV 20cw/ccw x 4           Pball rhomboids (if he has ball at home) NV 0#  2 x 10           Pball lower traps (if he has ball at home) NV 0#  2 x 10           AROM scaptiom  5#  2 x 10           AROM ABD to 90  5#  2 x 10                        Ther Activity                                       Gait Training                                       Modalities                          IE/HEP RG

## 2023-11-30 ENCOUNTER — APPOINTMENT (OUTPATIENT)
Dept: PHYSICAL THERAPY | Facility: CLINIC | Age: 68
End: 2023-11-30
Payer: MEDICARE

## 2023-12-05 ENCOUNTER — EVALUATION (OUTPATIENT)
Dept: PHYSICAL THERAPY | Facility: CLINIC | Age: 68
End: 2023-12-05
Payer: MEDICARE

## 2023-12-05 DIAGNOSIS — M12.811 ROTATOR CUFF ARTHROPATHY OF RIGHT SHOULDER: Primary | ICD-10-CM

## 2023-12-05 PROCEDURE — 97110 THERAPEUTIC EXERCISES: CPT | Performed by: PHYSICAL THERAPIST

## 2023-12-05 NOTE — PROGRESS NOTES
Discharge    Today's date: 2023  Patient name: Kevin Maloney  : 1955  MRN: [id number]  Referring provider: Colette Murdock MD  Dx:   Encounter Diagnosis     ICD-10-CM    1. Rotator cuff arthropathy of right shoulder  M12.811               Assessment  Assessment details: Patient is a 80 y.o. male who  presented to physical therapy initially with  with R shoulder pain and weakness associated with a rotator cuff tear. Since starting therapy pt has made the following progress towards goals:  1) Decreased pain  2) Improved strength  4) Improved self rated functional score(FOTO score improved to 75 frok 65 @ IE)    Making good progress, overall. Kiersten Mcgarry is functioning well and has a thorough HEP. Understanding of Dx/Px/POC: excellent  Goals  Short Term Goals:  1) Pain : Decrease shoulder pain to 2/10 at worst x 1 continuous week within 2-3 weeks. -met  2) Strength: Improved UE strength by at least 1/2 grade for all noted as weak within 2-3 weeks. -met  3) Function: Improved FOTO score from IE within 2-3 weeks (65@ IE), patient to note greater use of arm for ADLs like dressing and bathing within 2-3 weeks. -met     LongTerm Goals:  1) Pain : Eliminate shoulder pain  x 1 continuous week within 6 weeks. -not yet met  2) Strength: Improved UE strength to 5/5 within 6 weeks. -not yet met  3) Function: Improved FOTO score to at least 71 ; no reported difficulty with ADLs as they pertain to shoulder within 6 weeks. -met  4) (I) with HEP within [address] patient to continue with home exercise program which I reviewed with them today. Advised patient to contact me with any future questions or concerns regarding exercise. Pt is in agreement with discharge plan. Subjective Evaluation     History of Present Illness  Mechanism of injury: Patient is a 80 y.o. old male who presents for an initial outpatient physical therapy consultation regarding his R shoulder pain.   Has known (B) rotator cuff tearing via MRI. Treated in therapy for L shoulder pain in 2019. Has been going to gym at least 3 days a week. R shoulder feeling weaker especially with reaching activities above shoulder level. Can't throw a tennis ball. Recent consultation with Dr. Malini Kingsley. Referred for physical therapy. UPDATE 23: Has been doing home exercises. Feels therapy is helping. Not having as much AM pain. Patient Goals: has made progress towards goals  Patient goals for therapy: decreased pain, increased strength, independence with ADLs/IADLs and return to sport/leisure activities  Patient goal: to learn the best exercises to do in gym and at home    Pain  Current pain ratin  At worst pain ratin  Location: R anterolateral shoulder     Hand dominance: right           Objective     Right Shoulder AROM  Normal Value PROM  Normal Value Strength Normal Value   Shoulder Flexion 170 180 175 180 5 5   Shoulder Abduction 170 180 175 180 4+ 5   Shoulder External Rotation 85 90 90 90 5 5   Shoulder Internal Rotation T7 T7 65 70 5 5                                     Strength             Rhomboids 4+ 5           Lower Trapezius 4+ 5           Serratus Anterior 5 5              Drop Arm (-)  Empty Can (-)     (+) scapular dumping  on (L) indicative of periscapular weakness.          Precautions: history of prostate cancer       Manuals 10/30/23 11/9 12/5                                                              Neuro Re-Ed                                                                                                        Ther Ex             UBE NV 3/3 3/3          Tband armand ER walkouts NV Green  2 x 10           Ball circles at wall NV 20cw/ccw x 4           Pball rhomboids (if he has ball at home) NV 0#  2 x 10           Pball lower traps (if he has ball at home) NV 0#  2 x 10           AROM scaptiom  5#  2 x 10           AROM ABD to 90  5#  2 x 10           SL ER   4#  2 x 10          SL shoulder flexion   4#  2 x 10          Serratus Wall Walks   Labette   X 10          Ther Activity                                       Gait Training                                       Modalities                          IE/HEP RG

## 2023-12-29 ENCOUNTER — TELEPHONE (OUTPATIENT)
Age: 68
End: 2023-12-29

## 2023-12-29 ENCOUNTER — APPOINTMENT (OUTPATIENT)
Dept: LAB | Facility: MEDICAL CENTER | Age: 68
End: 2023-12-29
Payer: MEDICARE

## 2023-12-29 DIAGNOSIS — Z85.46 HISTORY OF PROSTATE CANCER: ICD-10-CM

## 2023-12-29 LAB — PSA SERPL-MCNC: <0.01 NG/ML (ref 0–4)

## 2023-12-29 PROCEDURE — 84153 ASSAY OF PSA TOTAL: CPT

## 2023-12-29 PROCEDURE — 36415 COLL VENOUS BLD VENIPUNCTURE: CPT

## 2023-12-29 NOTE — TELEPHONE ENCOUNTER
"Pt called requested a refill on Trimix   Per Arjun's last OV note     \"Will increase Trimix dose \"        Pt stated that the prescription was never sent to [facility].   "

## 2024-01-02 LAB

## 2024-01-16 ENCOUNTER — OFFICE VISIT (OUTPATIENT)
Dept: SLEEP CENTER | Facility: CLINIC | Age: 69
End: 2024-01-16
Payer: MEDICARE

## 2024-01-16 VITALS
DIASTOLIC BLOOD PRESSURE: 68 MMHG | BODY MASS INDEX: 28.58 KG/M2 | SYSTOLIC BLOOD PRESSURE: 111 MMHG | WEIGHT: 247 LBS | HEART RATE: 99 BPM | HEIGHT: 78 IN

## 2024-01-16 DIAGNOSIS — G47.33 OSA (OBSTRUCTIVE SLEEP APNEA): ICD-10-CM

## 2024-01-16 DIAGNOSIS — G25.81 RLS (RESTLESS LEGS SYNDROME): Primary | ICD-10-CM

## 2024-01-16 PROCEDURE — 99213 OFFICE O/P EST LOW 20 MIN: CPT | Performed by: PSYCHIATRY & NEUROLOGY

## 2024-01-16 NOTE — PATIENT INSTRUCTIONS

## 2024-01-16 NOTE — PROGRESS NOTES
Assessment/Plan:    1. RLS (restless legs syndrome)    2. MADELEINE (obstructive sleep apnea)  -     PAP DME Resupply/Reorder        Mr Lawrence is doing great, he is consistently using his CPAP machine, treatment is beneficial and effective.  No change is needed in his current settings.  I would recommend continued CPAP use.  I wrote an order to renew his CPAP supply prescription.    We discussed he also has a history of restless legs as well as periodic limb movements during sleep.  Unfortunately, I would not recommend treatment at the present time, we discussed that he drinks alcohol on a daily basis and that would not be compatible with the medications that are used for this condition.  We discussed that it would be better to cut back on alcohol regardless and if he pursued a Friday, we can assess a trial period of gabapentin or Horizant.  He understands but notes that he is quite social so that would be hard for him to achieve.    I will see him back in a follow-up visit in 1 year  Subjective:      Patient ID: Brent Lawrence is a 69 y.o. male.    HPI    This is a 69-year-old male who returns in a follow-up visit.  I last saw him in July 2023.  He has a history of obstructive sleep apnea, required a repeat test in July 2023 as his prior sleep study did not meet Medicare criteria.  This repeat test in July 2023 showed mild obstructive sleep apnea with an AHI of 7.7.  He also had increased periodic limb movements during sleep.  A new CPAP machine was ordered for home use, this is his first follow-up since that time    CPAP data reviewed today  AirSense 11 set at 7 to 9 cm H2O  Average session-7 hours 3 minutes, usage 26 out of 30 days  AHI 0.7  95% pressure-8.7 cm H2O  No significant mask leakage    He is going to bed between 1130 and 12, he falls asleep in 30 to 90 minutes, this varies.  He awakens several times during the night, ultimately around 6-630 AM, removes CPAP.  May go back to sleep until 8 AM.  Awakenings are  "not long.  He sleeps about 6 to 7 hours a night.     [place] Sleepiness Scale score is 2.    Benefit of CPAP- not choking/not stopping breathing     Has an urge to move his legs at 9-930 pm. Occurs nightly, also moves in sleep a lot.  Doesn't like meds for this,as he also drinks alcohol    Caffeine- 3 cups of coffee a day, partially caffeinated     [place] Sleepiness Scale  Sitting and reading: Would never doze  Watching TV: Would never doze  Sitting, inactive in a public place (e.g. a theatre or a meeting): Would never doze  As a passenger in a car for an hour without a break: Would never doze  Lying down to rest in the afternoon when circumstances permit: Moderate chance of dozing  Sitting and talking to someone: Would never doze  Sitting quietly after a lunch without alcohol: Would never doze  In a car, while stopped for a few minutes in traffic: Would never doze  Total score: 2      Review of Systems    As above    Objective:      /68 (BP Location: Left arm, Patient Position: Sitting, Cuff Size: Large)   Pulse 99   Ht 6' 7\" (2.007 m)   Wt 112 kg (247 lb)   BMI 27.83 kg/m²          Physical Exam    RRR  Lungs CTA b/l    "

## 2024-01-17 ENCOUNTER — TELEPHONE (OUTPATIENT)
Dept: SLEEP CENTER | Facility: CLINIC | Age: 69
End: 2024-01-17

## 2024-01-17 LAB

## 2024-04-02 ENCOUNTER — TELEPHONE (OUTPATIENT)
Dept: SLEEP CENTER | Facility: CLINIC | Age: 69
End: 2024-04-02

## 2024-04-02 NOTE — TELEPHONE ENCOUNTER
Confirmation of order form for CPAP supplies received via fax from [facility].    Form emailed to the [place] office for Dr. Arambula to complete.    Completed form to be faxed to [phone].

## 2024-05-14 NOTE — PROGRESS NOTES
5/15/2024      No chief complaint on file.      Assessment and Plan    69 y.o. male managed by AP team    1. Hx of Prostate Cancer  Diagnosed with Deanna 7 (4+3) in 10% of the tissue   S/p radical prostatectomy in 2015  Most recent PSA from 12/29/23 was <0.01  Repeat PSA annually     2. Erectile Dysfunction   Continue using Trimix 100-3-30  Refill sent 12/19/23   Not interested in IPP   Ensure highest dosage     History of Present Illness  Brent Lawrence is a 69 y.o. male here for annual evaluation with history of prostate cancer and erectile dysfunction.  Patient was diagnosed with Deanna 7 (4+3) and 10% of tissue from prostate biopsy in 2015.  He is now s/p radical prostatectomy also completed in 2015.  Most recent PSA from 12/29/2023 remains undetectable at < 0.01.  Continue to monitor PSA annually.  When it comes to the patient's erectile dysfunction he continues to use Trimix.  According to the chart he was [facility] he reports even at the highest dosage using ge of 100 - 3 - 30.  The most amount of medication he is able he is not receiving the effects he would like.  Refill of Trimix will be sent to [facility] pharmacy in order to make sure he is actually receiving the highest dose available.  IPP was discussed with patient, he is not interested in surgical procedures at this time.  Other than ongoing erectile dysfunction, his urinary pattern has remained stable.  He does have ongoing frequency and nocturia that may lead to mild incontinence if he tries to hold his urine for too long.  Overall he is content with his urinary pattern.  Plan to follow-up in 1 year to continue monitoring PSA.  Patient is agreeable to plan.    Review of Systems   Constitutional:  Negative for activity change, chills, fatigue and fever.   Respiratory:  Negative for apnea, cough and shortness of breath.    Cardiovascular:  Negative for chest pain and leg swelling.   Gastrointestinal:  Negative for  "abdominal distention, abdominal pain, constipation, diarrhea, nausea and vomiting.   Genitourinary:  Positive for frequency and urgency. Negative for decreased urine volume, difficulty urinating, dysuria, flank pain, hematuria, penile pain and testicular pain.        Erectile dysfunction   Neurological:  Negative for dizziness and headaches.   Psychiatric/Behavioral: Negative.           Vitals  Vitals:    05/15/24 1451   BP: 136/86   BP Location: Left arm   Patient Position: Sitting   Cuff Size: Standard   Pulse: 86   SpO2: 98%   Weight: 112 kg (248 lb)   Height: 6' 7\" (2.007 m)       Physical Exam  Vitals and nursing note reviewed.   Constitutional:       Appearance: Normal appearance.   HENT:      Head: Normocephalic and atraumatic.      Mouth/Throat:      Pharynx: Oropharynx is clear.   Eyes:      Extraocular Movements: Extraocular movements intact.      Conjunctiva/sclera: Conjunctivae normal.   Cardiovascular:      Rate and Rhythm: Normal rate.   Pulmonary:      Effort: Pulmonary effort is normal.   Abdominal:      General: Abdomen is flat. There is no distension.      Palpations: Abdomen is soft.      Tenderness: There is no abdominal tenderness.   Musculoskeletal:         General: Normal range of motion.      Cervical back: Normal range of motion.   Skin:     General: Skin is warm and dry.   Neurological:      General: No focal deficit present.      Mental Status: He is alert and oriented to person, place, and time.   Psychiatric:         Mood and Affect: Mood normal.         Behavior: Behavior normal.       Past History  Past Medical History:   Diagnosis Date    Balanitis     BPH with urinary obstruction     Elevated PSA     Erectile dysfunction following radical prostatectomy     Esophagitis     Gastritis     GERD (gastroesophageal reflux disease)     Hypercholesteremia     Malignant neoplasm prostate (HCC)     Restless leg syndrome     Sleep apnea     Sore muscles     Urinary incontinence      Social " "History     Socioeconomic History    Marital status: /Civil Union     Spouse name: None    Number of children: None    Years of education: None    Highest education level: None   Occupational History    Occupation: retired-consultant   Tobacco Use    Smoking status: Never    Smokeless tobacco: Never   Vaping Use    Vaping status: Never Used   Substance and Sexual Activity    Alcohol use: Yes     Alcohol/week: 14.0 - 21.0 standard drinks of alcohol     Types: 14 - 21 Standard drinks or equivalent per week     Comment:      Drug use: No    Sexual activity: None   Other Topics Concern    None   Social History Narrative    Drinks , 2 - 12 oz coffee (1/2 in the AM);  Drinks coffee 2-3 PM caffeinated      Social Determinants of Health     Financial Resource Strain: Not on file   Food Insecurity: Not on file   Transportation Needs: Not on file   Physical Activity: Not on file   Stress: Not on file   Social Connections: Not on file   Intimate Partner Violence: Not on file   Housing Stability: Not on file     Social History     Tobacco Use   Smoking Status Never   Smokeless Tobacco Never     Family History   Problem Relation Age of Onset    Emphysema Father     Heart disease Father     Testicular cancer Brother        The following portions of the patient's history were reviewed and updated as appropriate: allergies, current medications, past medical history, past social history, past surgical history and problem list.    Results  No results found for this or any previous visit (from the past 1 hour(s)).  ]  Lab Results   Component Value Date    PSA <0.01 12/29/2023    PSA <0.1 12/12/2022    PSA <0.1 04/13/2020    PSA <0.1 06/05/2019     Lab Results   Component Value Date    CALCIUM 9.1 12/18/2023    K 4.1 12/18/2023    CO2 29 12/18/2023     12/18/2023    BUN 22 12/18/2023    CREATININE 1.15 12/18/2023     No results found for: \"WBC\", \"HGB\", \"HCT\", \"MCV\", \"PLT\"    Lyndsey hSaikh PA-C    "

## 2024-05-15 ENCOUNTER — OFFICE VISIT (OUTPATIENT)
Dept: UROLOGY | Facility: MEDICAL CENTER | Age: 69
End: 2024-05-15
Payer: MEDICARE

## 2024-05-15 VITALS
DIASTOLIC BLOOD PRESSURE: 86 MMHG | HEART RATE: 86 BPM | BODY MASS INDEX: 28.69 KG/M2 | OXYGEN SATURATION: 98 % | WEIGHT: 248 LBS | SYSTOLIC BLOOD PRESSURE: 136 MMHG | HEIGHT: 78 IN

## 2024-05-15 DIAGNOSIS — Z87.898 HISTORY OF ELEVATED PSA: ICD-10-CM

## 2024-05-15 DIAGNOSIS — C61 MALIGNANT NEOPLASM OF PROSTATE (HCC): ICD-10-CM

## 2024-05-15 DIAGNOSIS — Z85.46 HISTORY OF PROSTATE CANCER: Primary | ICD-10-CM

## 2024-05-15 DIAGNOSIS — N52.31 ERECTILE DYSFUNCTION AFTER RADICAL PROSTATECTOMY: ICD-10-CM

## 2024-05-15 LAB
SL AMB  POCT GLUCOSE, UA: NORMAL
SL AMB LEUKOCYTE ESTERASE,UA: NORMAL
SL AMB POCT BILIRUBIN,UA: NORMAL
SL AMB POCT BLOOD,UA: NORMAL
SL AMB POCT CLARITY,UA: CLEAR
SL AMB POCT COLOR,UA: YELLOW
SL AMB POCT KETONES,UA: NORMAL
SL AMB POCT NITRITE,UA: NORMAL
SL AMB POCT PH,UA: NORMAL
SL AMB POCT SPECIFIC GRAVITY,UA: 1.02
SL AMB POCT URINE PROTEIN: NORMAL
SL AMB POCT UROBILINOGEN: 0.2

## 2024-05-15 PROCEDURE — 81003 URINALYSIS AUTO W/O SCOPE: CPT

## 2024-05-15 PROCEDURE — 99213 OFFICE O/P EST LOW 20 MIN: CPT

## 2024-05-15 RX ORDER — PREDNISONE 10 MG/1
TABLET ORAL DAILY
COMMUNITY

## 2024-07-31 ENCOUNTER — APPOINTMENT (OUTPATIENT)
Dept: LAB | Facility: MEDICAL CENTER | Age: 69
End: 2024-07-31

## 2024-08-01 ENCOUNTER — APPOINTMENT (OUTPATIENT)
Dept: LAB | Facility: MEDICAL CENTER | Age: 69
End: 2024-08-01
Payer: MEDICARE

## 2024-08-01 DIAGNOSIS — R19.7 DIARRHEA OF PRESUMED INFECTIOUS ORIGIN: ICD-10-CM

## 2024-08-01 DIAGNOSIS — K21.9 GASTROESOPHAGEAL REFLUX DISEASE, UNSPECIFIED WHETHER ESOPHAGITIS PRESENT: ICD-10-CM

## 2024-08-01 PROCEDURE — 87338 HPYLORI STOOL AG IA: CPT

## 2024-08-01 PROCEDURE — 87505 NFCT AGENT DETECTION GI: CPT

## 2024-08-02 LAB
C COLI+JEJUNI TUF STL QL NAA+PROBE: NEGATIVE
EC STX1+STX2 GENES STL QL NAA+PROBE: NEGATIVE
SALMONELLA SP SPAO STL QL NAA+PROBE: NEGATIVE
SHIGELLA SP+EIEC IPAH STL QL NAA+PROBE: NEGATIVE

## 2024-08-05 LAB — H PYLORI AG STL QL IA: NEGATIVE

## 2024-08-11 NOTE — TELEPHONE ENCOUNTER
PSA order faxed; confirmation received it went through 
Patient is requesting to have his PSA total faxed to [place] [address] lab [phone]
wound check